# Patient Record
Sex: FEMALE | Race: WHITE | NOT HISPANIC OR LATINO | URBAN - METROPOLITAN AREA
[De-identification: names, ages, dates, MRNs, and addresses within clinical notes are randomized per-mention and may not be internally consistent; named-entity substitution may affect disease eponyms.]

---

## 2023-03-07 ENCOUNTER — TELEPHONE (OUTPATIENT)
Dept: PRIMARY CARE | Facility: CLINIC | Age: 78
End: 2023-03-07

## 2023-03-07 RX ORDER — LEVOTHYROXINE SODIUM 88 UG/1
TABLET ORAL
COMMUNITY
Start: 2012-12-15 | End: 2024-02-22 | Stop reason: SDUPTHER

## 2023-03-07 RX ORDER — CARVEDILOL 6.25 MG/1
1 TABLET ORAL 2 TIMES DAILY
COMMUNITY
Start: 2013-12-10 | End: 2023-06-27 | Stop reason: SDUPTHER

## 2023-03-07 RX ORDER — ACETAMINOPHEN AND CODEINE PHOSPHATE 300; 30 MG/1; MG/1
TABLET ORAL
COMMUNITY
Start: 2022-10-01 | End: 2023-12-12 | Stop reason: SDUPTHER

## 2023-03-07 RX ORDER — NALOXONE HYDROCHLORIDE 4 MG/.1ML
SPRAY NASAL
COMMUNITY
Start: 2022-06-14

## 2023-03-07 RX ORDER — SPIRONOLACTONE 25 MG/1
1 TABLET ORAL 2 TIMES DAILY
COMMUNITY
Start: 2012-08-16 | End: 2024-02-22 | Stop reason: SDUPTHER

## 2023-03-07 RX ORDER — LISINOPRIL 2.5 MG/1
1 TABLET ORAL DAILY
COMMUNITY
Start: 2012-11-08

## 2023-03-07 RX ORDER — OMEPRAZOLE 40 MG/1
1 CAPSULE, DELAYED RELEASE ORAL DAILY
COMMUNITY
Start: 2012-03-12 | End: 2024-02-22 | Stop reason: SDUPTHER

## 2023-03-07 RX ORDER — NITROGLYCERIN 0.4 MG/1
TABLET SUBLINGUAL
COMMUNITY
Start: 2013-08-29

## 2023-03-07 RX ORDER — FUROSEMIDE 20 MG/1
50 TABLET ORAL
COMMUNITY
Start: 2017-06-19

## 2023-03-10 NOTE — TELEPHONE ENCOUNTER
PT IS REQUESTING A REFILL OF HER ATORVASTATIN, LEVOTHYROXINE & OMEPRAZOLE TO THE [facility].

## 2023-03-23 LAB
6-ACETYLMORPHINE: <25 NG/ML
7-AMINOCLONAZEPAM: <25 NG/ML
ALPHA-HYDROXYALPRAZOLAM: <25 NG/ML
ALPHA-HYDROXYMIDAZOLAM: <25 NG/ML
ALPRAZOLAM: <25 NG/ML
AMPHETAMINE (PRESENCE) IN URINE BY SCREEN METHOD: ABNORMAL
BARBITURATES PRESENCE IN URINE BY SCREEN METHOD: ABNORMAL
CANNABINOIDS IN URINE BY SCREEN METHOD: ABNORMAL
CHLORDIAZEPOXIDE: <25 NG/ML
CLONAZEPAM: <25 NG/ML
COCAINE (PRESENCE) IN URINE BY SCREEN METHOD: ABNORMAL
CODEINE: >2500 NG/ML
CREATINE, URINE FOR DRUG: 85.9 MG/DL
DIAZEPAM: <25 NG/ML
DRUG SCREEN COMMENT URINE: ABNORMAL
EDDP: <25 NG/ML
FENTANYL CONFIRMATION, URINE: <2.5 NG/ML
HYDROCODONE: 82 NG/ML
HYDROMORPHONE: 76 NG/ML
LORAZEPAM: <25 NG/ML
METHADONE CONFIRMATION,URINE: <25 NG/ML
MIDAZOLAM: <25 NG/ML
MORPHINE URINE: >2500 NG/ML
NORDIAZEPAM: <25 NG/ML
NORFENTANYL: <2.5 NG/ML
NORHYDROCODONE: 137 NG/ML
NOROXYCODONE: <25 NG/ML
O-DESMETHYLTRAMADOL: <50 NG/ML
OXAZEPAM: <25 NG/ML
OXYCODONE: <25 NG/ML
OXYMORPHONE: <25 NG/ML
PHENCYCLIDINE (PRESENCE) IN URINE BY SCREEN METHOD: ABNORMAL
TEMAZEPAM: <25 NG/ML
TRAMADOL: <50 NG/ML
ZOLPIDEM METABOLITE (ZCA): <25 NG/ML
ZOLPIDEM: <25 NG/ML

## 2023-05-31 LAB
ALANINE AMINOTRANSFERASE (SGPT) (U/L) IN SER/PLAS: 16 U/L (ref 7–45)
ALBUMIN (G/DL) IN SER/PLAS: 4.3 G/DL (ref 3.4–5)
ALKALINE PHOSPHATASE (U/L) IN SER/PLAS: 123 U/L (ref 33–136)
ANION GAP IN SER/PLAS: 15 MMOL/L (ref 10–20)
ASPARTATE AMINOTRANSFERASE (SGOT) (U/L) IN SER/PLAS: 22 U/L (ref 9–39)
BASOPHILS (10*3/UL) IN BLOOD BY AUTOMATED COUNT: 0.06 X10E9/L (ref 0–0.1)
BASOPHILS/100 LEUKOCYTES IN BLOOD BY AUTOMATED COUNT: 0.7 % (ref 0–2)
BILIRUBIN TOTAL (MG/DL) IN SER/PLAS: 0.5 MG/DL (ref 0–1.2)
CALCIUM (MG/DL) IN SER/PLAS: 9.4 MG/DL (ref 8.6–10.6)
CARBON DIOXIDE, TOTAL (MMOL/L) IN SER/PLAS: 30 MMOL/L (ref 21–32)
CHLORIDE (MMOL/L) IN SER/PLAS: 98 MMOL/L (ref 98–107)
CHOLESTEROL (MG/DL) IN SER/PLAS: 221 MG/DL (ref 0–199)
CHOLESTEROL IN HDL (MG/DL) IN SER/PLAS: 75.6 MG/DL
CHOLESTEROL/HDL RATIO: 2.9
CREATININE (MG/DL) IN SER/PLAS: 1.4 MG/DL (ref 0.5–1.05)
EOSINOPHILS (10*3/UL) IN BLOOD BY AUTOMATED COUNT: 0.27 X10E9/L (ref 0–0.4)
EOSINOPHILS/100 LEUKOCYTES IN BLOOD BY AUTOMATED COUNT: 3.3 % (ref 0–6)
ERYTHROCYTE DISTRIBUTION WIDTH (RATIO) BY AUTOMATED COUNT: 13.1 % (ref 11.5–14.5)
ERYTHROCYTE MEAN CORPUSCULAR HEMOGLOBIN CONCENTRATION (G/DL) BY AUTOMATED: 31.1 G/DL (ref 32–36)
ERYTHROCYTE MEAN CORPUSCULAR VOLUME (FL) BY AUTOMATED COUNT: 97 FL (ref 80–100)
ERYTHROCYTES (10*6/UL) IN BLOOD BY AUTOMATED COUNT: 4.01 X10E12/L (ref 4–5.2)
GFR FEMALE: 39 ML/MIN/1.73M2
GLUCOSE (MG/DL) IN SER/PLAS: 82 MG/DL (ref 74–99)
HEMATOCRIT (%) IN BLOOD BY AUTOMATED COUNT: 38.9 % (ref 36–46)
HEMOGLOBIN (G/DL) IN BLOOD: 12.1 G/DL (ref 12–16)
IMMATURE GRANULOCYTES/100 LEUKOCYTES IN BLOOD BY AUTOMATED COUNT: 0.4 % (ref 0–0.9)
LACTATE DEHYDROGENASE (U/L) IN SER/PLAS BY LAC->PYR RXN: 221 U/L (ref 84–246)
LDL: 120 MG/DL (ref 0–99)
LEUKOCYTES (10*3/UL) IN BLOOD BY AUTOMATED COUNT: 8.1 X10E9/L (ref 4.4–11.3)
LYMPHOCYTES (10*3/UL) IN BLOOD BY AUTOMATED COUNT: 2.28 X10E9/L (ref 0.8–3)
LYMPHOCYTES/100 LEUKOCYTES IN BLOOD BY AUTOMATED COUNT: 28 % (ref 13–44)
MAGNESIUM (MG/DL) IN SER/PLAS: 1.85 MG/DL (ref 1.6–2.4)
MONOCYTES (10*3/UL) IN BLOOD BY AUTOMATED COUNT: 0.67 X10E9/L (ref 0.05–0.8)
MONOCYTES/100 LEUKOCYTES IN BLOOD BY AUTOMATED COUNT: 8.2 % (ref 2–10)
NEUTROPHILS (10*3/UL) IN BLOOD BY AUTOMATED COUNT: 4.83 X10E9/L (ref 1.6–5.5)
NEUTROPHILS/100 LEUKOCYTES IN BLOOD BY AUTOMATED COUNT: 59.4 % (ref 40–80)
NRBC (PER 100 WBCS) BY AUTOMATED COUNT: 0 /100 WBC (ref 0–0)
PHOSPHATE (MG/DL) IN SER/PLAS: 4.2 MG/DL (ref 2.5–4.9)
PLATELETS (10*3/UL) IN BLOOD AUTOMATED COUNT: 321 X10E9/L (ref 150–450)
POTASSIUM (MMOL/L) IN SER/PLAS: 4.6 MMOL/L (ref 3.5–5.3)
PROTEIN TOTAL: 6.8 G/DL (ref 6.4–8.2)
SODIUM (MMOL/L) IN SER/PLAS: 138 MMOL/L (ref 136–145)
THYROTROPIN (MIU/L) IN SER/PLAS BY DETECTION LIMIT <= 0.05 MIU/L: 0.5 MIU/L (ref 0.44–3.98)
TRIGLYCERIDE (MG/DL) IN SER/PLAS: 128 MG/DL (ref 0–149)
URATE (MG/DL) IN SER/PLAS: 7.5 MG/DL (ref 2.3–6.7)
UREA NITROGEN (MG/DL) IN SER/PLAS: 20 MG/DL (ref 6–23)
VLDL: 26 MG/DL (ref 0–40)

## 2023-06-01 LAB — URINE CULTURE: NORMAL

## 2023-06-08 ENCOUNTER — APPOINTMENT (OUTPATIENT)
Dept: PRIMARY CARE | Facility: CLINIC | Age: 78
End: 2023-06-08

## 2023-06-27 ENCOUNTER — OFFICE VISIT (OUTPATIENT)
Dept: PRIMARY CARE | Facility: CLINIC | Age: 78
End: 2023-06-27
Payer: MEDICARE

## 2023-06-27 VITALS
HEART RATE: 73 BPM | BODY MASS INDEX: 28.51 KG/M2 | OXYGEN SATURATION: 95 % | SYSTOLIC BLOOD PRESSURE: 110 MMHG | WEIGHT: 146 LBS | DIASTOLIC BLOOD PRESSURE: 64 MMHG

## 2023-06-27 DIAGNOSIS — Z78.0 POST-MENOPAUSE: ICD-10-CM

## 2023-06-27 DIAGNOSIS — Z00.00 MEDICARE ANNUAL WELLNESS VISIT, SUBSEQUENT: Primary | ICD-10-CM

## 2023-06-27 DIAGNOSIS — G47.33 OBSTRUCTIVE SLEEP APNEA: ICD-10-CM

## 2023-06-27 DIAGNOSIS — M48.061 SPINAL STENOSIS OF LUMBAR REGION WITHOUT NEUROGENIC CLAUDICATION: ICD-10-CM

## 2023-06-27 DIAGNOSIS — K21.9 GASTROESOPHAGEAL REFLUX DISEASE WITHOUT ESOPHAGITIS: ICD-10-CM

## 2023-06-27 DIAGNOSIS — Z00.00 ROUTINE GENERAL MEDICAL EXAMINATION AT HEALTH CARE FACILITY: ICD-10-CM

## 2023-06-27 DIAGNOSIS — N18.32 CHRONIC KIDNEY DISEASE (CKD) STAGE G3B/A1, MODERATELY DECREASED GLOMERULAR FILTRATION RATE (GFR) BETWEEN 30-44 ML/MIN/1.73 SQUARE METER AND ALBUMINURIA CREATININE RATIO LESS THAN 30 MG/G (CMS* (MULTI): ICD-10-CM

## 2023-06-27 DIAGNOSIS — I50.9 CONGESTIVE HEART FAILURE, UNSPECIFIED HF CHRONICITY, UNSPECIFIED HEART FAILURE TYPE (MULTI): ICD-10-CM

## 2023-06-27 DIAGNOSIS — J44.9 CHRONIC OBSTRUCTIVE PULMONARY DISEASE, UNSPECIFIED COPD TYPE (MULTI): ICD-10-CM

## 2023-06-27 DIAGNOSIS — E03.9 ACQUIRED HYPOTHYROIDISM: ICD-10-CM

## 2023-06-27 DIAGNOSIS — E78.00 HYPERCHOLESTEROLEMIA: ICD-10-CM

## 2023-06-27 PROBLEM — E55.9 VITAMIN D DEFICIENCY: Status: ACTIVE | Noted: 2023-06-27

## 2023-06-27 PROBLEM — M17.0 OSTEOARTHRITIS OF KNEES, BILATERAL: Status: ACTIVE | Noted: 2023-06-27

## 2023-06-27 PROCEDURE — 99214 OFFICE O/P EST MOD 30 MIN: CPT | Performed by: FAMILY MEDICINE

## 2023-06-27 PROCEDURE — G0439 PPPS, SUBSEQ VISIT: HCPCS | Performed by: FAMILY MEDICINE

## 2023-06-27 PROCEDURE — 1160F RVW MEDS BY RX/DR IN RCRD: CPT | Performed by: FAMILY MEDICINE

## 2023-06-27 PROCEDURE — 1159F MED LIST DOCD IN RCRD: CPT | Performed by: FAMILY MEDICINE

## 2023-06-27 PROCEDURE — 99397 PER PM REEVAL EST PAT 65+ YR: CPT | Performed by: FAMILY MEDICINE

## 2023-06-27 PROCEDURE — 1036F TOBACCO NON-USER: CPT | Performed by: FAMILY MEDICINE

## 2023-06-27 PROCEDURE — 1170F FXNL STATUS ASSESSED: CPT | Performed by: FAMILY MEDICINE

## 2023-06-27 RX ORDER — LANOLIN ALCOHOL/MO/W.PET/CERES
500 CREAM (GRAM) TOPICAL 2 TIMES DAILY
COMMUNITY

## 2023-06-27 RX ORDER — FLUOCINONIDE GEL 0.5 MG/G
GEL TOPICAL 2 TIMES DAILY
COMMUNITY

## 2023-06-27 RX ORDER — CETIRIZINE HYDROCHLORIDE 10 MG/1
10 TABLET ORAL DAILY
COMMUNITY

## 2023-06-27 RX ORDER — CARVEDILOL 6.25 MG/1
6.25 TABLET ORAL 2 TIMES DAILY
Qty: 180 TABLET | Refills: 1 | Status: SHIPPED | OUTPATIENT
Start: 2023-06-27

## 2023-06-27 RX ORDER — PROMETHAZINE HYDROCHLORIDE 25 MG/1
25 TABLET ORAL EVERY 6 HOURS PRN
COMMUNITY

## 2023-06-27 RX ORDER — ATORVASTATIN CALCIUM 20 MG/1
40 TABLET, FILM COATED ORAL DAILY
COMMUNITY
Start: 2022-09-29

## 2023-06-27 ASSESSMENT — PATIENT HEALTH QUESTIONNAIRE - PHQ9
SUM OF ALL RESPONSES TO PHQ9 QUESTIONS 1 AND 2: 0
2. FEELING DOWN, DEPRESSED OR HOPELESS: NOT AT ALL
SUM OF ALL RESPONSES TO PHQ9 QUESTIONS 1 AND 2: 0
1. LITTLE INTEREST OR PLEASURE IN DOING THINGS: NOT AT ALL
1. LITTLE INTEREST OR PLEASURE IN DOING THINGS: NOT AT ALL
2. FEELING DOWN, DEPRESSED OR HOPELESS: NOT AT ALL

## 2023-06-27 ASSESSMENT — ENCOUNTER SYMPTOMS
OCCASIONAL FEELINGS OF UNSTEADINESS: 0
LOSS OF SENSATION IN FEET: 0
DEPRESSION: 0

## 2023-06-27 ASSESSMENT — ACTIVITIES OF DAILY LIVING (ADL)
DRESSING: INDEPENDENT
MANAGING_FINANCES: INDEPENDENT
GROCERY_SHOPPING: INDEPENDENT
TAKING_MEDICATION: INDEPENDENT
DOING_HOUSEWORK: INDEPENDENT
BATHING: INDEPENDENT

## 2023-06-27 NOTE — PROGRESS NOTES
Medicare Wellness Billing Compliance Satisfied    *This is a visual tool to show completion of required items on the day of the visit. Green checks will only appear on the date of visit.    Review all medications by prescribing practitioner or clinical pharmacist (such as prescriptions, OTCs, herbal therapies and supplements) documented in the medical record    Past Medical, Surgical, and Family History reviewed and updated in chart    Tobacco Use Reviewed    Alcohol Use Reviewed    Illicit Drug Use Reviewed    PHQ2/9    Falls in Last Year Reviewed    Home Safety Risk Factors Reviewed    Cognitive Impairment Reviewed    Patient Self Assessment and Health Status    Current Diet Reviewed    Exercise Frequency    ADL - Hearing Impairment    ADL - Bathing    ADL - Dressing    ADL - Walks in Home    IADL - Managing Finances    IADL - Grocery Shopping    IADL - Taking Medications    IADL - Doing Housework    Subjective   Patient ID: Amira Alex is a 77 y.o. female who presents for Follow-up (MED FOLLOW UP) and Medicare Annual Wellness Visit Subsequent.    HPI   Pt is doing ok  Pt denies cp, sob   Does have some edema   Had labs  Some more strain renally  Sees cardio  Pt denies fatigue  Review of Systems   All other systems reviewed and are negative.      Objective   /64   Pulse 73   Wt 66.2 kg (146 lb)   SpO2 95%   BMI 28.51 kg/m²     Physical Exam  Constitutional:       Appearance: Normal appearance.   HENT:      Head: Normocephalic and atraumatic.      Right Ear: Tympanic membrane, ear canal and external ear normal.      Left Ear: Tympanic membrane, ear canal and external ear normal.      Nose: Nose normal.      Mouth/Throat:      Mouth: Mucous membranes are moist.      Pharynx: Oropharynx is clear.   Eyes:      Extraocular Movements: Extraocular movements intact.      Conjunctiva/sclera: Conjunctivae normal.      Pupils: Pupils are equal, round, and reactive to light.    Cardiovascular:      Rate and Rhythm: Normal rate and regular rhythm.      Pulses: Normal pulses.      Heart sounds: Normal heart sounds.   Pulmonary:      Effort: Pulmonary effort is normal.      Breath sounds: Normal breath sounds.   Abdominal:      General: Abdomen is flat. Bowel sounds are normal.      Palpations: Abdomen is soft.   Musculoskeletal:         General: Normal range of motion.      Cervical back: Normal range of motion and neck supple.   Skin:     General: Skin is warm and dry.      Capillary Refill: Capillary refill takes less than 2 seconds.   Neurological:      General: No focal deficit present.      Mental Status: She is alert and oriented to person, place, and time.   Psychiatric:         Mood and Affect: Mood normal.         Behavior: Behavior normal.         Thought Content: Thought content normal.         Assessment/Plan   Diagnoses and all orders for this visit:  Medicare annual wellness visit, subsequent  Chronic kidney disease (CKD) stage G3b/A1, moderately decreased glomerular filtration rate (GFR) between 30-44 mL/min/1.73 square meter and albuminuria creatinine ratio less than 30 mg/g  Congestive heart failure, unspecified HF chronicity, unspecified heart failure type (CMS/HCC)  -     carvedilol (Coreg) 6.25 mg tablet; Take 1 tablet (6.25 mg) by mouth 2 times a day.  Chronic obstructive pulmonary disease, unspecified COPD type (CMS/HCC)  Obstructive sleep apnea  Hypercholesterolemia  Gastroesophageal reflux disease without esophagitis  Acquired hypothyroidism  Spinal stenosis of lumbar region without neurogenic claudication  Post-menopause  -     XR DEXA bone density; Future  Routine general medical examination at health care facility

## 2023-06-27 NOTE — PROGRESS NOTES
Subjective   Reason for Visit: Amira Alex is an 77 y.o. female here for a Medicare Wellness visit.     Past Medical, Surgical, and Family History reviewed and updated in chart.    Reviewed all medications by prescribing practitioner or clinical pharmacist (such as prescriptions, OTCs, herbal therapies and supplements) and documented in the medical record.    HPI    Patient Care Team:  Ana Paula Julien DO as PCP - General  Ana Paula Julien DO as PCP - [employer] PCP     Review of Systems    Objective   Vitals:  /64   Pulse 73   Wt 66.2 kg (146 lb)   SpO2 95%   BMI 28.51 kg/m²       Physical Exam    Assessment/Plan   Problem List Items Addressed This Visit       Chronic kidney disease (CKD) stage G3b/A1, moderately decreased glomerular filtration rate (GFR) between 30-44 mL/min/1.73 square meter and albuminuria creatinine ratio less than 30 mg/g    Congestive heart failure (CMS/[facility])    Relevant Medications    carvedilol (Coreg) 6.25 mg tablet    COPD (chronic obstructive pulmonary disease) (CMS/HCC)    Esophageal reflux    Hypercholesterolemia    Hypothyroidism    Lumbar spinal stenosis    Obstructive sleep apnea    Medicare annual wellness visit, subsequent - Primary     Other Visit Diagnoses       Post-menopause        Relevant Orders    XR DEXA bone density    Routine general medical examination at health care facility

## 2023-08-11 LAB
ALBUMIN (G/DL) IN SER/PLAS: 4.3 G/DL (ref 3.4–5)
ANION GAP IN SER/PLAS: 13 MMOL/L (ref 10–20)
CALCIUM (MG/DL) IN SER/PLAS: 9.8 MG/DL (ref 8.6–10.6)
CARBON DIOXIDE, TOTAL (MMOL/L) IN SER/PLAS: 29 MMOL/L (ref 21–32)
CHLORIDE (MMOL/L) IN SER/PLAS: 101 MMOL/L (ref 98–107)
CHOLESTEROL (MG/DL) IN SER/PLAS: 174 MG/DL (ref 0–199)
CHOLESTEROL IN HDL (MG/DL) IN SER/PLAS: 67.1 MG/DL
CHOLESTEROL/HDL RATIO: 2.6
CREATININE (MG/DL) IN SER/PLAS: 1.38 MG/DL (ref 0.5–1.05)
GFR FEMALE: 39 ML/MIN/1.73M2
GLUCOSE (MG/DL) IN SER/PLAS: 90 MG/DL (ref 74–99)
LACTATE DEHYDROGENASE (U/L) IN SER/PLAS BY LAC->PYR RXN: 175 U/L (ref 84–246)
LDL: 83 MG/DL (ref 0–99)
PHOSPHATE (MG/DL) IN SER/PLAS: 4.2 MG/DL (ref 2.5–4.9)
POTASSIUM (MMOL/L) IN SER/PLAS: 4.6 MMOL/L (ref 3.5–5.3)
SODIUM (MMOL/L) IN SER/PLAS: 138 MMOL/L (ref 136–145)
TRIGLYCERIDE (MG/DL) IN SER/PLAS: 119 MG/DL (ref 0–149)
UREA NITROGEN (MG/DL) IN SER/PLAS: 15 MG/DL (ref 6–23)
VLDL: 24 MG/DL (ref 0–40)

## 2023-11-14 ENCOUNTER — APPOINTMENT (OUTPATIENT)
Dept: PAIN MEDICINE | Facility: CLINIC | Age: 78
End: 2023-11-14

## 2023-12-12 ENCOUNTER — OFFICE VISIT (OUTPATIENT)
Dept: PAIN MEDICINE | Facility: CLINIC | Age: 78
End: 2023-12-12
Payer: MEDICARE

## 2023-12-12 ENCOUNTER — LAB (OUTPATIENT)
Dept: LAB | Facility: LAB | Age: 78
End: 2023-12-12
Payer: MEDICARE

## 2023-12-12 DIAGNOSIS — M48.062 SPINAL STENOSIS OF LUMBAR REGION WITH NEUROGENIC CLAUDICATION: Primary | ICD-10-CM

## 2023-12-12 DIAGNOSIS — Z79.891 LONG TERM CURRENT USE OF OPIATE ANALGESIC: ICD-10-CM

## 2023-12-12 DIAGNOSIS — M17.0 PRIMARY OSTEOARTHRITIS OF BOTH KNEES: ICD-10-CM

## 2023-12-12 LAB
AMPHETAMINES UR QL SCN: NORMAL
BARBITURATES UR QL SCN: NORMAL
BZE UR QL SCN: NORMAL
CANNABINOIDS UR QL SCN: NORMAL
CREAT UR-MCNC: 151.4 MG/DL (ref 20–320)
PCP UR QL SCN: NORMAL

## 2023-12-12 PROCEDURE — 80365 DRUG SCREENING OXYCODONE: CPT

## 2023-12-12 PROCEDURE — 1036F TOBACCO NON-USER: CPT | Performed by: PHYSICAL MEDICINE & REHABILITATION

## 2023-12-12 PROCEDURE — 80368 SEDATIVE HYPNOTICS: CPT

## 2023-12-12 PROCEDURE — 1160F RVW MEDS BY RX/DR IN RCRD: CPT | Performed by: PHYSICAL MEDICINE & REHABILITATION

## 2023-12-12 PROCEDURE — 80361 OPIATES 1 OR MORE: CPT

## 2023-12-12 PROCEDURE — 80346 BENZODIAZEPINES1-12: CPT

## 2023-12-12 PROCEDURE — 80307 DRUG TEST PRSMV CHEM ANLYZR: CPT

## 2023-12-12 PROCEDURE — 82570 ASSAY OF URINE CREATININE: CPT

## 2023-12-12 PROCEDURE — 80354 DRUG SCREENING FENTANYL: CPT

## 2023-12-12 PROCEDURE — 80358 DRUG SCREENING METHADONE: CPT

## 2023-12-12 PROCEDURE — 1159F MED LIST DOCD IN RCRD: CPT | Performed by: PHYSICAL MEDICINE & REHABILITATION

## 2023-12-12 PROCEDURE — 80373 DRUG SCREENING TRAMADOL: CPT

## 2023-12-12 PROCEDURE — 99214 OFFICE O/P EST MOD 30 MIN: CPT | Performed by: PHYSICAL MEDICINE & REHABILITATION

## 2023-12-12 RX ORDER — ACETAMINOPHEN AND CODEINE PHOSPHATE 300; 30 MG/1; MG/1
1 TABLET ORAL EVERY 8 HOURS PRN
Qty: 90 TABLET | Refills: 1 | Status: SHIPPED | OUTPATIENT
Start: 2023-12-26 | End: 2024-02-26 | Stop reason: SDUPTHER

## 2023-12-12 NOTE — PROGRESS NOTES
Chief complaint  Back and knees pain     History  Ms Elena back for visit  The worst pain in the knee . She is also having back pain   The pain in the right knee is deep achy stabbing.  It is both on the medial and lateral aspects and behind the kneecap.  The knee pain is associated with sensation of crunching upon range of motion and weightbearing.  The pain is continuous at rest associated with stiffness with prolonged sitting and get worse with standing walking or any weightbearing activity.  Occasionally there is knee swelling.  No change in color or texture of the skin.  No pain proximal to the thigh or distal to the leg associated with the knee pain.  With episodes of aggravation of the pain there are occasion of sensation of instability but no falls.    Also The pain in the back is deep achy worse in the mid back area.  This is associated with tight muscle bands.  This limits the range of motion of the lumbar spine mainly in forward flexion.  The pain in the back is radiating around the side on the lateral aspect of the   thighs going down toward the lateral aspect of the legs into the lateral malleosli toward the lateral aspect of the feet towards the big toes.    This pain down to the lower limbs is more of a burning tingling sensation.  The pain in the   lower limbs worsens with bending forward or with any lifting, it improves with laying on the side and resting.  This is similar to the associated pain in the middle to lower back.  Denied any bowel or bladder incontinence.  With the worst pain there is tendency to catch the toe especially on carpeted area.  This occurs mostly when tired and toward the end of the day.       Pain level without medication is 8/10 , with the medication pain level 5/10.     The pain meds are helping control the pain and improving Activities of Daily living and quality of life and quality of sleep.    opioids treatment agreement 2023  Oarrs pulled and scanned in the chart   no concerns  last urine toxicology testing earlier this year and it was compliant we will repeat  Xray updated spine and knees  ORT Score is  0  Pain pathology and pain generators spine knees   Modalities tried injection, surgery, physical therapy, TENS unit, nonsteroidal anti-inflammatory medication       Denied any fever or chills. No weight loss and no night sweats. No cough or sputum production. No diarrhea   The constipation has been responding to fibers and over the counter medications.     No bladder and bowel incontinence and no other changes in bladder and bowel. No skin changes.  Reports tiredness and fatigability only if the pain is not controlled.   Denied opioids diversion and abuse and denies alcoholism. Denies overuse of the pain medications.    The control of the pain with the pain medications is helping the control of the symptoms and allowing the function and activities of daily living, enjoyment of life, improving the quality of life and sleep with less interruption by the pain. The goal is symptomatic control of the nonmalignant chronic pain and not to repair the permanent damage in the tissues inducing the chronic pain conditions. We are aiming to shift the focus from the nonmalignant chronic pain to other aspects of life by symptomatically treating this chronic pain. If this pain is not treated it will lead to major morbidity and it is also associated with increased risks of mortality. The patient understands those very clearly and also understand high risks of morbidity and mortality if not strictly adherent to the treatment recommendations and reporting any associated side effects. Also patient understand the full responsibility associated with these medications to avoid abuse or overuse or any use of these medications for anything besides treating the patient's own chronic pain and nothing else under any circumstances.        Physical examination  Awake, alert and oriented for time place and  persons   declined Chaperone for the visit and was adequately  draped for the exam.    Examination of the right knee showed mild clinical effusion. Normal skin color and texture palpation of the knee showed tenderness over the medial and lateral joint line and the sensation of crepitation upon range of motion.  Range of motion from -2 degrees to 105 degrees. No medial lateral instability. No drawer sign.  Lachman is negative.  Positive Roxy both medially and laterally.  Negative pivot shift.  Homans' sign is negative.  Calves are soft.  Knee flexion and extension is 4/5 and limited by the pain.    Similar findings on the left     Diagnosis  Problem List Items Addressed This Visit       Lumbar spinal stenosis - Primary    Relevant Medications    acetaminophen-codeine (Tylenol w/ Codeine #3) 300-30 mg tablet (Start on 12/26/2023)    Osteoarthritis of knees, bilateral    Relevant Medications    acetaminophen-codeine (Tylenol w/ Codeine #3) 300-30 mg tablet (Start on 12/26/2023)     Other Visit Diagnoses       Long term current use of opiate analgesic        Relevant Medications    acetaminophen-codeine (Tylenol w/ Codeine #3) 300-30 mg tablet (Start on 12/26/2023)    Other Relevant Orders    Opiate/Opioid/Benzo Prescription Compliance             Plan  Reviewed the pain generators.  Went over the types of pain with neuropathic and nociceptive and different pathologies and therapeutic modalities. Discussed the mechanism of action of interventions from acupuncture, physical therapy , regular exercises, injections, botox, spinal cord stimulation, and role of surgery     Went over pathology of the intervertebral disc displacement and the anatomical relation to the Nerve roots and relation to the radicular symptoms. Went over treatment modalities with conservative treatment including acupuncture   and epidural steroid injection with fluoroscopy guidance and last resort of surgery    Based on the above findings and the  clinical response to the opioids medications and improvement of the activities of daily living, sleep, and work performance. We made this complex decision to continue the opioids therapy in light of the evidence of the patient's responsibility in using the pain medications as prescribed for the nonmalignant chronic pain condition. We discussed about the use of the pain medications to treat the symptoms of chronic nonmalignant pain and we are not trying the repair the permanent damage in the tissues, rather we are trying to control the symptoms induced by the permanent damage to the tissues inducing the chronic pain condition and resulting disability. I explained the difference and discussed it with the patient and stressed the importance of knowing the difference especially because of the potential side effects and the potential addicting effect and habit forming nature of the dangerous drugs we are using to treat the symptoms of the chronic pain.      We discussed that we are prescribing the medications on good luigi and legitimate medical reason.     We reviewed the side effects and precautions of opioids prescriptions as discussed in the opioids treatment agreement.    realizes the interaction between the therapeutic classes including the respiratory depression and potential death     Random drug testing twice in 6 months we will submit     Tylenol codeine #3 TID     Discussed about NSAIDS and I explained about the opioids sparing effect to allow keeping the opioids dose at minimal effective dose.   I went over the potential side effects of the NSAIDS on the gastrointestinal, renal and cardiovascular systems.      I detailed the side effects from the acetaminophen in the medication and made aware of those. I also explained about the cumulative effects on the organs and mainly the liver.     Given the opioids therapy , we discussed about the risk for accidental over dose on the pain medications, either for patient  or other household. I went over the mechanism of action and mode of use of the Naloxone according to the  recommendations. I will provide a prescription for a kit.     Follow-up 8 weeks or earlier if needed     The level of clinical decision making in this office visit,  is high, given the high risks of complications with the morbidity and mortality due to the fact that acute and chronic pain may pose a threat to life and bodily function, if under treated, poorly treated, or with failure to maintain adequate treatment and timely medical follow up. Additionally over treatment has its own set of complications including overdosing on the pain medications and also the habit forming potentials with the use of the medications used to treat chronic painful conditions including therapeutic classes classified as dangerous medications. Given the serious and fluctuating nature of pain level and instensity with extensive consideration for whenever pain changes, there is always the risk of prolonged functional impairment requiring close patient monitoring with regular assessments and reassessments and high level medical decision making at every office visit. The amount and complexity of data reviewed is high given the patient clinical presentation, labs,  data, radiology reports, and other tests as discussed during office visits. Pertinent data whether positive or negative were taken in consideration in the process of making this high level medical decision.

## 2023-12-15 LAB
1OH-MIDAZOLAM UR CFM-MCNC: <25 NG/ML
6MAM UR CFM-MCNC: <25 NG/ML
7AMINOCLONAZEPAM UR CFM-MCNC: <25 NG/ML
A-OH ALPRAZ UR CFM-MCNC: <25 NG/ML
ALPRAZ UR CFM-MCNC: <25 NG/ML
CHLORDIAZEP UR CFM-MCNC: <25 NG/ML
CLONAZEPAM UR CFM-MCNC: <25 NG/ML
CODEINE UR CFM-MCNC: <50 NG/ML
DIAZEPAM UR CFM-MCNC: <25 NG/ML
EDDP UR CFM-MCNC: <25 NG/ML
FENTANYL UR CFM-MCNC: <2.5 NG/ML
HYDROCODONE CTO UR CFM-MCNC: <25 NG/ML
HYDROMORPHONE UR CFM-MCNC: 140 NG/ML
LORAZEPAM UR CFM-MCNC: <25 NG/ML
METHADONE UR CFM-MCNC: <25 NG/ML
MIDAZOLAM UR CFM-MCNC: <25 NG/ML
MORPHINE UR CFM-MCNC: >2500 NG/ML
NORDIAZEPAM UR CFM-MCNC: <25 NG/ML
NORFENTANYL UR CFM-MCNC: <2.5 NG/ML
NORHYDROCODONE UR CFM-MCNC: 292 NG/ML
NOROXYCODONE UR CFM-MCNC: <25 NG/ML
NORTRAMADOL UR-MCNC: <50 NG/ML
OXAZEPAM UR CFM-MCNC: <25 NG/ML
OXYCODONE UR CFM-MCNC: <25 NG/ML
OXYMORPHONE UR CFM-MCNC: <25 NG/ML
TEMAZEPAM UR CFM-MCNC: <25 NG/ML
TRAMADOL UR CFM-MCNC: <50 NG/ML
ZOLPIDEM UR CFM-MCNC: <25 NG/ML
ZOLPIDEM UR-MCNC: <25 NG/ML

## 2023-12-20 ENCOUNTER — APPOINTMENT (OUTPATIENT)
Dept: PRIMARY CARE | Facility: CLINIC | Age: 78
End: 2023-12-20

## 2023-12-27 ENCOUNTER — LAB (OUTPATIENT)
Dept: LAB | Facility: LAB | Age: 78
End: 2023-12-27
Payer: MEDICARE

## 2023-12-27 DIAGNOSIS — E03.9 HYPOTHYROIDISM, UNSPECIFIED: ICD-10-CM

## 2023-12-27 DIAGNOSIS — I12.9 HYPERTENSIVE CHRONIC KIDNEY DISEASE WITH STAGE 1 THROUGH STAGE 4 CHRONIC KIDNEY DISEASE, OR UNSPECIFIED CHRONIC KIDNEY DISEASE: ICD-10-CM

## 2023-12-27 DIAGNOSIS — I50.30 UNSPECIFIED DIASTOLIC (CONGESTIVE) HEART FAILURE (MULTI): Primary | ICD-10-CM

## 2023-12-27 DIAGNOSIS — N39.0 URINARY TRACT INFECTION, SITE NOT SPECIFIED: ICD-10-CM

## 2023-12-27 LAB
ALBUMIN SERPL BCP-MCNC: 4.2 G/DL (ref 3.4–5)
ALP SERPL-CCNC: 113 U/L (ref 33–136)
ANION GAP SERPL CALC-SCNC: 14 MMOL/L (ref 10–20)
AST SERPL W P-5'-P-CCNC: 20 U/L (ref 9–39)
BUN SERPL-MCNC: 19 MG/DL (ref 6–23)
CALCIUM SERPL-MCNC: 9.4 MG/DL (ref 8.6–10.6)
CHLORIDE SERPL-SCNC: 99 MMOL/L (ref 98–107)
CHOLEST SERPL-MCNC: 182 MG/DL (ref 0–199)
CHOLESTEROL/HDL RATIO: 2.3
CO2 SERPL-SCNC: 33 MMOL/L (ref 21–32)
CREAT SERPL-MCNC: 1.46 MG/DL (ref 0.5–1.05)
ERYTHROCYTE [DISTWIDTH] IN BLOOD BY AUTOMATED COUNT: 12.8 % (ref 11.5–14.5)
GFR SERPL CREATININE-BSD FRML MDRD: 37 ML/MIN/1.73M*2
GLUCOSE SERPL-MCNC: 79 MG/DL (ref 74–99)
HCT VFR BLD AUTO: 37.8 % (ref 36–46)
HDLC SERPL-MCNC: 77.5 MG/DL
HGB BLD-MCNC: 12.2 G/DL (ref 12–16)
LDH SERPL L TO P-CCNC: 205 U/L (ref 84–246)
LDLC SERPL CALC-MCNC: 87 MG/DL
MAGNESIUM SERPL-MCNC: 1.4 MG/DL (ref 1.6–2.4)
MCH RBC QN AUTO: 31 PG (ref 26–34)
MCHC RBC AUTO-ENTMCNC: 32.3 G/DL (ref 32–36)
MCV RBC AUTO: 96 FL (ref 80–100)
NON HDL CHOLESTEROL: 105 MG/DL (ref 0–149)
NRBC BLD-RTO: 0 /100 WBCS (ref 0–0)
PHOSPHATE SERPL-MCNC: 3.9 MG/DL (ref 2.5–4.9)
PLATELET # BLD AUTO: 352 X10*3/UL (ref 150–450)
POTASSIUM SERPL-SCNC: 4.4 MMOL/L (ref 3.5–5.3)
RBC # BLD AUTO: 3.94 X10*6/UL (ref 4–5.2)
SODIUM SERPL-SCNC: 142 MMOL/L (ref 136–145)
T3 SERPL-MCNC: 141 NG/DL (ref 60–200)
T4 FREE SERPL-MCNC: 1.43 NG/DL (ref 0.78–1.48)
TRIGL SERPL-MCNC: 86 MG/DL (ref 0–149)
TSH SERPL-ACNC: 1.71 MIU/L (ref 0.44–3.98)
URATE SERPL-MCNC: 7.6 MG/DL (ref 2.3–6.7)
VLDL: 17 MG/DL (ref 0–40)
WBC # BLD AUTO: 9.9 X10*3/UL (ref 4.4–11.3)

## 2023-12-27 PROCEDURE — 84443 ASSAY THYROID STIM HORMONE: CPT

## 2023-12-27 PROCEDURE — 87086 URINE CULTURE/COLONY COUNT: CPT

## 2023-12-27 PROCEDURE — 84075 ASSAY ALKALINE PHOSPHATASE: CPT

## 2023-12-27 PROCEDURE — 36415 COLL VENOUS BLD VENIPUNCTURE: CPT

## 2023-12-27 PROCEDURE — 85027 COMPLETE CBC AUTOMATED: CPT

## 2023-12-27 PROCEDURE — 83615 LACTATE (LD) (LDH) ENZYME: CPT

## 2023-12-27 PROCEDURE — 80069 RENAL FUNCTION PANEL: CPT

## 2023-12-27 PROCEDURE — 84439 ASSAY OF FREE THYROXINE: CPT

## 2023-12-27 PROCEDURE — 84550 ASSAY OF BLOOD/URIC ACID: CPT

## 2023-12-27 PROCEDURE — 84480 ASSAY TRIIODOTHYRONINE (T3): CPT

## 2023-12-27 PROCEDURE — 83735 ASSAY OF MAGNESIUM: CPT

## 2023-12-27 PROCEDURE — 80061 LIPID PANEL: CPT

## 2023-12-27 PROCEDURE — 84450 TRANSFERASE (AST) (SGOT): CPT

## 2023-12-28 LAB — BACTERIA UR CULT: NO GROWTH

## 2024-02-07 ENCOUNTER — APPOINTMENT (OUTPATIENT)
Dept: PRIMARY CARE | Facility: CLINIC | Age: 79
End: 2024-02-07
Payer: COMMERCIAL

## 2024-02-20 ENCOUNTER — OFFICE VISIT (OUTPATIENT)
Dept: PAIN MEDICINE | Facility: CLINIC | Age: 79
End: 2024-02-20
Payer: COMMERCIAL

## 2024-02-20 ENCOUNTER — APPOINTMENT (OUTPATIENT)
Dept: LAB | Facility: LAB | Age: 79
End: 2024-02-20
Payer: COMMERCIAL

## 2024-02-20 DIAGNOSIS — M17.0 PRIMARY OSTEOARTHRITIS OF BOTH KNEES: ICD-10-CM

## 2024-02-20 DIAGNOSIS — M48.062 SPINAL STENOSIS OF LUMBAR REGION WITH NEUROGENIC CLAUDICATION: Primary | ICD-10-CM

## 2024-02-20 PROCEDURE — 99214 OFFICE O/P EST MOD 30 MIN: CPT | Performed by: PHYSICAL MEDICINE & REHABILITATION

## 2024-02-20 PROCEDURE — 1036F TOBACCO NON-USER: CPT | Performed by: PHYSICAL MEDICINE & REHABILITATION

## 2024-02-20 NOTE — PROGRESS NOTES
Chief complaint  Back and pain in knees    History  Ms Fallon is back for visit  Continues with pain in back  Did not want injection nor SCS   Pain in the back There is decreased sensory to light touch on the lateral aspects of the thighs and around the   knee going down to the lateral aspect of the legs to the   lateral malleoli into the dorsum of the feet..    Deep tendon reflexes is present for the patellar tendons bilaterally.  Achilles reflexes are present bilaterally and symmetric.    Medial Hamstrings reflex is decreased bilaterally  Plantar cutaneous are downgoing.  Ankle dorsiflexion is 5/5 bilaterally.  Plantar flexion of the ankles are 5/5 bilaterally.  Big toe extension is 4/5 bilaterally  Negative Tinel's sign over the right peroneal nerve at the fibular neck.  Atilio sign for axial loading and global rotation are negative.  No aberrant pain behavior.  Also knees pain      Pain level without medication is 8/10 , with the medication pain level 3/10.     The pain meds are helping control the pain and improving Activities of Daily living and quality of life and quality of sleep.    opioids treatment agreement Feb 2024  PDI (Pain Disability Index) score: 29  Oarrs pulled and scanned in the chart  no concerns  last urine toxicology testing earlier this year and it was compliant we will repeat  Xray updated spine   ORT Score is  0  Pain pathology and pain generators spine and knees   Modalities tried injection, surgery, physical therapy, TENS unit, nonsteroidal anti-inflammatory medication       Denied any fever or chills. No weight loss and no night sweats. No cough or sputum production. No diarrhea   The constipation has been responding to fibers and over the counter medications.     No bladder and bowel incontinence and no other changes in bladder and bowel. No skin changes.  Reports tiredness and fatigability only if the pain is not controlled.   Denied opioids diversion and abuse and denies  alcoholism. Denies overuse of the pain medications.    The control of the pain with the pain medications is helping the control of the symptoms and allowing the function and activities of daily living, enjoyment of life, improving the quality of life and sleep with less interruption by the pain. The goal is symptomatic control of the nonmalignant chronic pain and not to repair the permanent damage in the tissues inducing the chronic pain conditions. We are aiming to shift the focus from the nonmalignant chronic pain to other aspects of life by symptomatically treating this chronic pain. If this pain is not treated it will lead to major morbidity and it is also associated with increased risks of mortality. The patient understands those very clearly and also understand high risks of morbidity and mortality if not strictly adherent to the treatment recommendations and reporting any associated side effects. Also patient understand the full responsibility associated with these medications to avoid abuse or overuse or any use of these medications for anything besides treating the patient's own chronic pain and nothing else under any circumstances.        Physical examination  Awake, alert and oriented for time place and persons   declined Chaperone for the visit and was adequately  draped for the exam.    Examination of the lumbar spine showed mild reversal of the lumbar lordosis .    Tight muscle bands over the   lower lumbar paraspinals area.  Batista test on the   lower lumbar area increases the pain with stabilization of the lower lumbar facets bilaterally at  L45 and L5S1,  combined with extension and rotation of the lumbar spine to the eith side reproduced and worsened the back pain on that side.  The pain improved with leaning forward.  Straight leg raising was negative.  No sensorimotor deficits in the lower limbs.  Atilio testing were negative for axial loading and log rotation.  No aberrant pain behavior.       Diagnosis  Problem List Items Addressed This Visit       Lumbar spinal stenosis - Primary    Osteoarthritis of knees, bilateral        Plan  Reviewed the pain generators.  Went over the types of pain with neuropathic and nociceptive and different pathologies and therapeutic modalities. Discussed the mechanism of action of interventions from acupuncture, physical therapy , regular exercises, injections, botox, spinal cord stimulation, and role of surgery     Went over pathology of the intervertebral disc displacement and the anatomical relation to the Nerve roots and relation to the radicular symptoms. Went over treatment modalities with conservative treatment including acupuncture   and epidural steroid injection with fluoroscopy guidance and last resort of surgery    Based on the above findings and the clinical response to the opioids medications and improvement of the activities of daily living, sleep, and work performance. We made this complex decision to continue the opioids therapy in light of the evidence of the patient's responsibility in using the pain medications as prescribed for the nonmalignant chronic pain condition. We discussed about the use of the pain medications to treat the symptoms of chronic nonmalignant pain and we are not trying the repair the permanent damage in the tissues, rather we are trying to control the symptoms induced by the permanent damage to the tissues inducing the chronic pain condition and resulting disability. I explained the difference and discussed it with the patient and stressed the importance of knowing the difference especially because of the potential side effects and the potential addicting effect and habit forming nature of the dangerous drugs we are using to treat the symptoms of the chronic pain.      We discussed that we are prescribing the medications on good luigi and legitimate medical reason.     We reviewed the side effects and precautions of opioids  prescriptions as discussed in the opioids treatment agreement.    realizes the interaction between the therapeutic classes including the respiratory depression and potential death     Random drug testing twice in 6 months we will submit     T#3 , 3 times a day   has a narcan at home know how and when to use it if needed.   Continue with pain meds to control pain but consider cutting back on pain meds   In past gabapentin and lyrica did not help     Discussed about NSAIDS and I explained about the opioids sparing effect to allow keeping the opioids dose at minimal effective dose.   I went over the potential side effects of the NSAIDS on the gastrointestinal, renal and cardiovascular systems.      I detailed the side effects from the acetaminophen in the medication and made aware of those. I also explained about the cumulative effects on the organs and mainly the liver.     Given the opioids therapy , we discussed about the risk for accidental over dose on the pain medications, either for patient or other household. I went over the mechanism of action and mode of use of the Naloxone according to the  recommendations. I will provide a prescription for a kit.     Follow-up 8 weeks or earlier if needed     The level of clinical decision making in this office visit,  is high, given the high risks of complications with the morbidity and mortality due to the fact that acute and chronic pain may pose a threat to life and bodily function, if under treated, poorly treated, or with failure to maintain adequate treatment and timely medical follow up. Additionally over treatment has its own set of complications including overdosing on the pain medications and also the habit forming potentials with the use of the medications used to treat chronic painful conditions including therapeutic classes classified as dangerous medications. Given the serious and fluctuating nature of pain level and instensity with extensive  consideration for whenever pain changes, there is always the risk of prolonged functional impairment requiring close patient monitoring with regular assessments and reassessments and high level medical decision making at every office visit. The amount and complexity of data reviewed is high given the patient clinical presentation, labs,  data, radiology reports, and other tests as discussed during office visits. Pertinent data whether positive or negative were taken in consideration in the process of making this high level medical decision.

## 2024-02-22 ENCOUNTER — OFFICE VISIT (OUTPATIENT)
Dept: PRIMARY CARE | Facility: CLINIC | Age: 79
End: 2024-02-22
Payer: COMMERCIAL

## 2024-02-22 VITALS
TEMPERATURE: 97.2 F | BODY MASS INDEX: 27.11 KG/M2 | WEIGHT: 153 LBS | OXYGEN SATURATION: 96 % | HEIGHT: 63 IN | SYSTOLIC BLOOD PRESSURE: 127 MMHG | HEART RATE: 68 BPM | DIASTOLIC BLOOD PRESSURE: 67 MMHG

## 2024-02-22 DIAGNOSIS — J44.9 CHRONIC OBSTRUCTIVE PULMONARY DISEASE, UNSPECIFIED COPD TYPE (MULTI): ICD-10-CM

## 2024-02-22 DIAGNOSIS — E03.9 ACQUIRED HYPOTHYROIDISM: ICD-10-CM

## 2024-02-22 DIAGNOSIS — K21.9 GASTROESOPHAGEAL REFLUX DISEASE WITHOUT ESOPHAGITIS: ICD-10-CM

## 2024-02-22 DIAGNOSIS — I50.23 HEART FAILURE, SYSTOLIC, ACUTE ON CHRONIC (MULTI): ICD-10-CM

## 2024-02-22 DIAGNOSIS — N18.32 CHRONIC KIDNEY DISEASE (CKD) STAGE G3B/A1, MODERATELY DECREASED GLOMERULAR FILTRATION RATE (GFR) BETWEEN 30-44 ML/MIN/1.73 SQUARE METER AND ALBUMINURIA CREATININE RATIO LESS THAN 30 MG/G (CMS* (MULTI): Primary | ICD-10-CM

## 2024-02-22 DIAGNOSIS — I50.9 CONGESTIVE HEART FAILURE, UNSPECIFIED HF CHRONICITY, UNSPECIFIED HEART FAILURE TYPE (MULTI): ICD-10-CM

## 2024-02-22 DIAGNOSIS — M06.9 RHEUMATOID ARTHRITIS, INVOLVING UNSPECIFIED SITE, UNSPECIFIED WHETHER RHEUMATOID FACTOR PRESENT (MULTI): ICD-10-CM

## 2024-02-22 DIAGNOSIS — I73.9 PERIPHERAL VASCULAR DISEASE, UNSPECIFIED (CMS-HCC): ICD-10-CM

## 2024-02-22 DIAGNOSIS — E78.00 HYPERCHOLESTEROLEMIA: ICD-10-CM

## 2024-02-22 PROBLEM — N18.30 STAGE 3 CHRONIC KIDNEY DISEASE (MULTI): Status: ACTIVE | Noted: 2023-06-27

## 2024-02-22 PROBLEM — B96.89 ACUTE BACTERIAL SINUSITIS: Status: ACTIVE | Noted: 2024-02-22

## 2024-02-22 PROBLEM — M75.40 IMPINGEMENT SYNDROME OF SHOULDER REGION: Status: ACTIVE | Noted: 2024-02-22

## 2024-02-22 PROBLEM — L57.0 ACTINIC KERATOSIS: Status: ACTIVE | Noted: 2018-09-17

## 2024-02-22 PROBLEM — L82.1 OTHER SEBORRHEIC KERATOSIS: Status: ACTIVE | Noted: 2018-09-17

## 2024-02-22 PROBLEM — L03.90 CELLULITIS: Status: ACTIVE | Noted: 2024-02-22

## 2024-02-22 PROBLEM — G47.9 DISTURBANCE IN SLEEP BEHAVIOR: Status: ACTIVE | Noted: 2024-02-22

## 2024-02-22 PROBLEM — I87.2 VENOUS STASIS DERMATITIS: Status: ACTIVE | Noted: 2024-02-22

## 2024-02-22 PROBLEM — F41.9 ANXIETY: Status: ACTIVE | Noted: 2024-02-22

## 2024-02-22 PROBLEM — M17.9 OSTEOARTHRITIS OF KNEE: Status: ACTIVE | Noted: 2023-06-27

## 2024-02-22 PROBLEM — J20.9 ACUTE BRONCHITIS: Status: ACTIVE | Noted: 2024-02-22

## 2024-02-22 PROBLEM — M62.81 MUSCLE WEAKNESS: Status: ACTIVE | Noted: 2024-02-22

## 2024-02-22 PROBLEM — L57.9 SKIN CHANGES DUE TO CHRONIC EXPOSURE TO NONIONIZING RADIATION, UNSPECIFIED: Status: ACTIVE | Noted: 2018-09-17

## 2024-02-22 PROBLEM — M47.816 SPONDYLOSIS OF LUMBAR SPINE: Status: ACTIVE | Noted: 2024-02-22

## 2024-02-22 PROBLEM — D22.9 MELANOCYTIC NEVUS OF SKIN: Status: ACTIVE | Noted: 2024-02-22

## 2024-02-22 PROBLEM — E78.5 DYSLIPIDEMIA: Status: ACTIVE | Noted: 2024-02-22

## 2024-02-22 PROBLEM — R05.9 COUGH: Status: ACTIVE | Noted: 2024-02-22

## 2024-02-22 PROBLEM — J01.90 ACUTE BACTERIAL SINUSITIS: Status: ACTIVE | Noted: 2024-02-22

## 2024-02-22 PROBLEM — L72.0 EPIDERMAL CYST: Status: ACTIVE | Noted: 2018-09-17

## 2024-02-22 PROCEDURE — 99214 OFFICE O/P EST MOD 30 MIN: CPT | Performed by: FAMILY MEDICINE

## 2024-02-22 PROCEDURE — 1160F RVW MEDS BY RX/DR IN RCRD: CPT | Performed by: FAMILY MEDICINE

## 2024-02-22 PROCEDURE — 1036F TOBACCO NON-USER: CPT | Performed by: FAMILY MEDICINE

## 2024-02-22 PROCEDURE — 1159F MED LIST DOCD IN RCRD: CPT | Performed by: FAMILY MEDICINE

## 2024-02-22 RX ORDER — SPIRONOLACTONE 25 MG/1
25 TABLET ORAL 2 TIMES DAILY
Qty: 180 TABLET | Refills: 1 | Status: SHIPPED | OUTPATIENT
Start: 2024-02-22

## 2024-02-22 RX ORDER — LEVOTHYROXINE SODIUM 88 UG/1
88 TABLET ORAL
Qty: 90 TABLET | Refills: 1 | Status: SHIPPED | OUTPATIENT
Start: 2024-02-22

## 2024-02-22 RX ORDER — OMEPRAZOLE 40 MG/1
40 CAPSULE, DELAYED RELEASE ORAL DAILY
Qty: 90 CAPSULE | Refills: 1 | Status: SHIPPED | OUTPATIENT
Start: 2024-02-22

## 2024-02-22 ASSESSMENT — PATIENT HEALTH QUESTIONNAIRE - PHQ9
SUM OF ALL RESPONSES TO PHQ9 QUESTIONS 1 AND 2: 0
1. LITTLE INTEREST OR PLEASURE IN DOING THINGS: NOT AT ALL
2. FEELING DOWN, DEPRESSED OR HOPELESS: NOT AT ALL

## 2024-02-22 ASSESSMENT — ENCOUNTER SYMPTOMS
LOSS OF SENSATION IN FEET: 0
DEPRESSION: 0
OCCASIONAL FEELINGS OF UNSTEADINESS: 0

## 2024-02-22 NOTE — PROGRESS NOTES
"Subjective   Patient ID: Amira Alex is a 78 y.o. female who presents for fuv.    HPI     Review of Systems   All other systems reviewed and are negative.  Pt is here for follow up  Pt denies cp, sob,  She does have edema  A is stable  Mood is stable      Objective   /67   Pulse 68   Temp 36.2 °C (97.2 °F)   Ht 1.6 m (5' 3\")   Wt 69.4 kg (153 lb)   SpO2 96%   BMI 27.10 kg/m²     Physical Exam  Constitutional:       Appearance: Normal appearance.   HENT:      Head: Normocephalic and atraumatic.      Right Ear: Tympanic membrane, ear canal and external ear normal.      Left Ear: Tympanic membrane, ear canal and external ear normal.      Nose: Nose normal.      Mouth/Throat:      Mouth: Mucous membranes are moist.      Pharynx: Oropharynx is clear.   Eyes:      Extraocular Movements: Extraocular movements intact.      Conjunctiva/sclera: Conjunctivae normal.      Pupils: Pupils are equal, round, and reactive to light.   Cardiovascular:      Rate and Rhythm: Normal rate and regular rhythm.      Pulses: Normal pulses.      Heart sounds: Normal heart sounds.   Pulmonary:      Effort: Pulmonary effort is normal.      Breath sounds: Normal breath sounds.   Abdominal:      General: Abdomen is flat. Bowel sounds are normal.      Palpations: Abdomen is soft.   Genitourinary:     Comments: nl  Musculoskeletal:         General: Normal range of motion.      Cervical back: Normal range of motion and neck supple.   Skin:     General: Skin is warm and dry.      Capillary Refill: Capillary refill takes less than 2 seconds.   Neurological:      General: No focal deficit present.      Mental Status: She is alert and oriented to person, place, and time.   Psychiatric:         Mood and Affect: Mood normal.         Behavior: Behavior normal.         Thought Content: Thought content normal.         Assessment/Plan   Diagnoses and all orders for this visit:  Chronic kidney disease (CKD) stage G3b/A1, moderately decreased " glomerular filtration rate (GFR) between 30-44 mL/min/1.73 square meter and albuminuria creatinine ratio less than 30 mg/g (CMS/[facility])  -     CBC and Auto Differential; Future  Congestive heart failure, unspecified HF chronicity, unspecified heart failure type (CMS/[facility])  -     spironolactone (Aldactone) 25 mg tablet; Take 1 tablet (25 mg) by mouth 2 times a day.  -     CBC and Auto Differential; Future  Hypercholesterolemia  -     Lipid Panel; Future  -     Comprehensive Metabolic Panel; Future  Acquired hypothyroidism  -     levothyroxine (Synthroid, Levoxyl) 88 mcg tablet; Take 1 tablet (88 mcg) by mouth once daily in the morning. Take before meals.  -     TSH with reflex to Free T4 if abnormal; Future  Gastroesophageal reflux disease without esophagitis  -     omeprazole (PriLOSEC) 40 mg DR capsule; Take 1 capsule (40 mg) by mouth once daily.  Peripheral vascular disease, unspecified (CMS/[facility])  Rheumatoid arthritis, involving unspecified site, unspecified whether rheumatoid factor present (CMS/[facility])  Chronic obstructive pulmonary disease, unspecified COPD type (CMS/[facility])  Heart failure, systolic, acute on chronic (CMS/[facility])

## 2024-04-23 ENCOUNTER — OFFICE VISIT (OUTPATIENT)
Dept: PAIN MEDICINE | Facility: CLINIC | Age: 79
End: 2024-04-23
Payer: COMMERCIAL

## 2024-04-23 DIAGNOSIS — M48.062 SPINAL STENOSIS OF LUMBAR REGION WITH NEUROGENIC CLAUDICATION: ICD-10-CM

## 2024-04-23 DIAGNOSIS — M17.0 PRIMARY OSTEOARTHRITIS OF BOTH KNEES: ICD-10-CM

## 2024-04-23 DIAGNOSIS — M75.42 IMPINGEMENT SYNDROME OF LEFT SHOULDER: ICD-10-CM

## 2024-04-23 DIAGNOSIS — M17.12 PRIMARY OSTEOARTHRITIS OF LEFT KNEE: ICD-10-CM

## 2024-04-23 DIAGNOSIS — M62.81 MUSCLE WEAKNESS: ICD-10-CM

## 2024-04-23 DIAGNOSIS — Z79.891 LONG TERM CURRENT USE OF OPIATE ANALGESIC: Primary | ICD-10-CM

## 2024-04-23 PROCEDURE — 99214 OFFICE O/P EST MOD 30 MIN: CPT | Performed by: PHYSICAL MEDICINE & REHABILITATION

## 2024-04-23 PROCEDURE — 1159F MED LIST DOCD IN RCRD: CPT | Performed by: PHYSICAL MEDICINE & REHABILITATION

## 2024-04-23 PROCEDURE — 1160F RVW MEDS BY RX/DR IN RCRD: CPT | Performed by: PHYSICAL MEDICINE & REHABILITATION

## 2024-04-23 RX ORDER — ACETAMINOPHEN AND CODEINE PHOSPHATE 300; 30 MG/1; MG/1
1 TABLET ORAL EVERY 8 HOURS PRN
Qty: 90 TABLET | Refills: 1 | Status: SHIPPED | OUTPATIENT
Start: 2024-04-23

## 2024-04-23 NOTE — PROGRESS NOTES
Chief complaint  Pain in left knee and sholder    History  Amira Alex is back for pain management office visit  Continue with pain left shoulder knee  The pain in the left  knee is deep achy stabbing.  It is both on the medial and lateral aspects and behind the kneecap.  The knee pain is associated with sensation of crunching upon range of motion and weightbearing.  The pain is continuous at rest associated with stiffness with prolonged sitting and get worse with standing walking or any weightbearing activity.  Occasionally there is knee swelling.  No change in color or texture of the skin.  No pain proximal to the thigh or distal to the leg associated with the knee pain.  With episodes of aggravation of the pain there are occasion of sensation of instability but no falls.       Pain level without medication is 8/10 , with the medication pain level 3/10.     The pain meds are helping control the pain and improving Activities of Daily living and quality of life and quality of sleep.    opioids treatment agreement Jan 2024     Oarrs pulled and scanned in the chart  no concerns  last urine toxicology testing earlier this year and it was compliant we will repeat  Xray updated spine   ORT Score is  0  Pain pathology and pain generators joints   Modalities tried injection, surgery, physical therapy, TENS unit, nonsteroidal anti-inflammatory medication       Denied any fever or chills. No weight loss and no night sweats. No cough or sputum production. No diarrhea   The constipation has been responding to fibers and over the counter medications.     No bladder and bowel incontinence and no other changes in bladder and bowel. No skin changes.  Reports tiredness and fatigability only if the pain is not controlled.   Denied opioids diversion and abuse and denies alcoholism. Denies overuse of the pain medications.    The control of the pain with the pain medications is helping the control of the symptoms and allowing the  function and activities of daily living, enjoyment of life, improving the quality of life and sleep with less interruption by the pain. The goal is symptomatic control of the nonmalignant chronic pain and not to repair the permanent damage in the tissues inducing the chronic pain conditions. We are aiming to shift the focus from the nonmalignant chronic pain to other aspects of life by symptomatically treating this chronic pain. If this pain is not treated it will lead to major morbidity and it is also associated with increased risks of mortality. The patient understands those very clearly and also understand high risks of morbidity and mortality if not strictly adherent to the treatment recommendations and reporting any associated side effects. Also patient understand the full responsibility associated with these medications to avoid abuse or overuse or any use of these medications for anything besides treating the patient's own chronic pain and nothing else under any circumstances.        Physical examination  Awake, alert and oriented for time place and persons   declined Chaperone for the visit and was adequately  draped for the exam.  Examination of the left knee showed mild clinical effusion. Normal skin color and texture palpation of the knee showed tenderness over the medial and lateral joint line and the sensation of crepitation upon range of motion.  Range of motion from -2 degrees to 105 degrees. No medial lateral instability. No drawer sign.  Lachman is negative.  Positive Roxy both medially and laterally.  Negative pivot shift.  Homans' sign is negative.  Calves are soft.  Knee flexion and extension is 4/5 and limited by the pain.     Diagnosis  Problem List Items Addressed This Visit       Lumbar spinal stenosis    Relevant Medications    acetaminophen-codeine (Tylenol w/ Codeine #3) 300-30 mg tablet    Osteoarthritis of knees, bilateral    Relevant Medications    acetaminophen-codeine (Tylenol w/  Codeine #3) 300-30 mg tablet    Osteoarthritis of knee    Relevant Medications    acetaminophen-codeine (Tylenol w/ Codeine #3) 300-30 mg tablet    Muscle weakness    Impingement syndrome of shoulder region     Other Visit Diagnoses       Long term current use of opiate analgesic    -  Primary    Relevant Medications    acetaminophen-codeine (Tylenol w/ Codeine #3) 300-30 mg tablet    Other Relevant Orders    Opiate/Opioid/Benzo Prescription Compliance             Plan  Reviewed the pain generators.  Went over the types of pain with neuropathic and nociceptive and different pathologies and therapeutic modalities. Discussed the mechanism of action of interventions from acupuncture, physical therapy , regular exercises, injections, botox, spinal cord stimulation, and role of surgery     Went over pathology of the intervertebral disc displacement and the anatomical relation to the Nerve roots and relation to the radicular symptoms. Went over treatment modalities with conservative treatment including acupuncture   and epidural steroid injection with fluoroscopy guidance and last resort of surgery    Based on the above findings and the clinical response to the opioids medications and improvement of the activities of daily living, sleep, and work performance. We made this complex decision to continue the opioids therapy in light of the evidence of the patient's responsibility in using the pain medications as prescribed for the nonmalignant chronic pain condition. We discussed about the use of the pain medications to treat the symptoms of chronic nonmalignant pain and we are not trying the repair the permanent damage in the tissues, rather we are trying to control the symptoms induced by the permanent damage to the tissues inducing the chronic pain condition and resulting disability. I explained the difference and discussed it with the patient and stressed the importance of knowing the difference especially because of the  potential side effects and the potential addicting effect and habit forming nature of the dangerous drugs we are using to treat the symptoms of the chronic pain.      We discussed that we are prescribing the medications on good luigi and legitimate medical reason.     We reviewed the side effects and precautions of opioids prescriptions as discussed in the opioids treatment agreement.    realizes the interaction between the therapeutic classes including the respiratory depression and potential death     Random drug testing twice in 6 months we will submit     Long discussion about putting effort in cutting back on pain medications. Discussed about pain level changing and we do not know if the medications at this amount are still needed until we try and cut back slowly on pain medications. If the cut is tolerated then we continue. If cut not tolerated then, will go back on the pain medications level.  The goal from this is to keep the pain medications at the lowest effective dose.   I discussed about  about considering increasing the dose of the long acting and cut back the number of doses of the short acting medications. That will decrease the number of doses being dispensed daily.       Discussed about NSAIDS and I explained about the opioids sparing effect to allow keeping the opioids dose at minimal effective dose.   I went over the potential side effects of the NSAIDS on the gastrointestinal, renal and cardiovascular systems.      I detailed the side effects from the acetaminophen in the medication and made aware of those. I also explained about the cumulative effects on the organs and mainly the liver.     Given the opioids therapy , we discussed about the risk for accidental over dose on the pain medications, either for patient or other household. I went over the mechanism of action and mode of use of the Naloxone according to the  recommendations. I will provide a prescription for a kit.      Follow-up 8 weeks or earlier if needed     The level of clinical decision making in this office visit,  is high, given the high risks of complications with the morbidity and mortality due to the fact that acute and chronic pain may pose a threat to life and bodily function, if under treated, poorly treated, or with failure to maintain adequate treatment and timely medical follow up. Additionally over treatment has its own set of complications including overdosing on the pain medications and also the habit forming potentials with the use of the medications used to treat chronic painful conditions including therapeutic classes classified as dangerous medications. Given the serious and fluctuating nature of pain level and instensity with extensive consideration for whenever pain changes, there is always the risk of prolonged functional impairment requiring close patient monitoring with regular assessments and reassessments and high level medical decision making at every office visit. The amount and complexity of data reviewed is high given the patient clinical presentation, labs,  data, radiology reports, and other tests as discussed during office visits. Pertinent data whether positive or negative were taken in consideration in the process of making this high level medical decision.

## 2024-05-08 ENCOUNTER — LAB (OUTPATIENT)
Dept: LAB | Facility: LAB | Age: 79
End: 2024-05-08
Payer: COMMERCIAL

## 2024-05-08 DIAGNOSIS — N18.32 CHRONIC KIDNEY DISEASE (CKD) STAGE G3B/A1, MODERATELY DECREASED GLOMERULAR FILTRATION RATE (GFR) BETWEEN 30-44 ML/MIN/1.73 SQUARE METER AND ALBUMINURIA CREATININE RATIO LESS THAN 30 MG/G (CMS* (MULTI): ICD-10-CM

## 2024-05-08 DIAGNOSIS — E03.9 ACQUIRED HYPOTHYROIDISM: ICD-10-CM

## 2024-05-08 DIAGNOSIS — I50.9 CONGESTIVE HEART FAILURE, UNSPECIFIED HF CHRONICITY, UNSPECIFIED HEART FAILURE TYPE (MULTI): ICD-10-CM

## 2024-05-08 DIAGNOSIS — Z79.891 LONG TERM CURRENT USE OF OPIATE ANALGESIC: ICD-10-CM

## 2024-05-08 DIAGNOSIS — E78.00 HYPERCHOLESTEROLEMIA: ICD-10-CM

## 2024-05-08 LAB
ALBUMIN SERPL BCP-MCNC: 4.1 G/DL (ref 3.4–5)
ALP SERPL-CCNC: 118 U/L (ref 33–136)
ALT SERPL W P-5'-P-CCNC: 11 U/L (ref 7–45)
AMPHETAMINES UR QL SCN: NORMAL
ANION GAP SERPL CALC-SCNC: 13 MMOL/L (ref 10–20)
AST SERPL W P-5'-P-CCNC: 15 U/L (ref 9–39)
BARBITURATES UR QL SCN: NORMAL
BASOPHILS # BLD AUTO: 0.11 X10*3/UL (ref 0–0.1)
BASOPHILS NFR BLD AUTO: 1.1 %
BILIRUB SERPL-MCNC: 0.5 MG/DL (ref 0–1.2)
BUN SERPL-MCNC: 18 MG/DL (ref 6–23)
BZE UR QL SCN: NORMAL
CALCIUM SERPL-MCNC: 9.2 MG/DL (ref 8.6–10.6)
CANNABINOIDS UR QL SCN: NORMAL
CHLORIDE SERPL-SCNC: 102 MMOL/L (ref 98–107)
CHOLEST SERPL-MCNC: 159 MG/DL (ref 0–199)
CHOLESTEROL/HDL RATIO: 2.8
CO2 SERPL-SCNC: 29 MMOL/L (ref 21–32)
CREAT SERPL-MCNC: 1.24 MG/DL (ref 0.5–1.05)
CREAT UR-MCNC: 209.5 MG/DL (ref 20–320)
EGFRCR SERPLBLD CKD-EPI 2021: 45 ML/MIN/1.73M*2
EOSINOPHIL # BLD AUTO: 1.17 X10*3/UL (ref 0–0.4)
EOSINOPHIL NFR BLD AUTO: 12.1 %
ERYTHROCYTE [DISTWIDTH] IN BLOOD BY AUTOMATED COUNT: 13.3 % (ref 11.5–14.5)
GLUCOSE SERPL-MCNC: 97 MG/DL (ref 74–99)
HCT VFR BLD AUTO: 37.2 % (ref 36–46)
HDLC SERPL-MCNC: 56.5 MG/DL
HGB BLD-MCNC: 11.8 G/DL (ref 12–16)
IMM GRANULOCYTES # BLD AUTO: 0.04 X10*3/UL (ref 0–0.5)
IMM GRANULOCYTES NFR BLD AUTO: 0.4 % (ref 0–0.9)
LDLC SERPL CALC-MCNC: 84 MG/DL
LYMPHOCYTES # BLD AUTO: 2.63 X10*3/UL (ref 0.8–3)
LYMPHOCYTES NFR BLD AUTO: 27.1 %
MCH RBC QN AUTO: 29.6 PG (ref 26–34)
MCHC RBC AUTO-ENTMCNC: 31.7 G/DL (ref 32–36)
MCV RBC AUTO: 93 FL (ref 80–100)
MONOCYTES # BLD AUTO: 0.74 X10*3/UL (ref 0.05–0.8)
MONOCYTES NFR BLD AUTO: 7.6 %
NEUTROPHILS # BLD AUTO: 5.01 X10*3/UL (ref 1.6–5.5)
NEUTROPHILS NFR BLD AUTO: 51.7 %
NON HDL CHOLESTEROL: 103 MG/DL (ref 0–149)
NRBC BLD-RTO: 0 /100 WBCS (ref 0–0)
PCP UR QL SCN: NORMAL
PLATELET # BLD AUTO: 352 X10*3/UL (ref 150–450)
POTASSIUM SERPL-SCNC: 4.2 MMOL/L (ref 3.5–5.3)
PROT SERPL-MCNC: 6.5 G/DL (ref 6.4–8.2)
RBC # BLD AUTO: 3.99 X10*6/UL (ref 4–5.2)
SODIUM SERPL-SCNC: 140 MMOL/L (ref 136–145)
TRIGL SERPL-MCNC: 92 MG/DL (ref 0–149)
TSH SERPL-ACNC: 0.86 MIU/L (ref 0.44–3.98)
VLDL: 18 MG/DL (ref 0–40)
WBC # BLD AUTO: 9.7 X10*3/UL (ref 4.4–11.3)

## 2024-05-08 PROCEDURE — 80307 DRUG TEST PRSMV CHEM ANLYZR: CPT

## 2024-05-08 PROCEDURE — 82570 ASSAY OF URINE CREATININE: CPT

## 2024-05-08 PROCEDURE — 85025 COMPLETE CBC W/AUTO DIFF WBC: CPT

## 2024-05-08 PROCEDURE — 80358 DRUG SCREENING METHADONE: CPT

## 2024-05-08 PROCEDURE — 80361 OPIATES 1 OR MORE: CPT

## 2024-05-08 PROCEDURE — 36415 COLL VENOUS BLD VENIPUNCTURE: CPT

## 2024-05-08 PROCEDURE — 84443 ASSAY THYROID STIM HORMONE: CPT

## 2024-05-08 PROCEDURE — 80365 DRUG SCREENING OXYCODONE: CPT

## 2024-05-08 PROCEDURE — 80373 DRUG SCREENING TRAMADOL: CPT

## 2024-05-08 PROCEDURE — 80346 BENZODIAZEPINES1-12: CPT

## 2024-05-08 PROCEDURE — 80354 DRUG SCREENING FENTANYL: CPT

## 2024-05-08 PROCEDURE — 80368 SEDATIVE HYPNOTICS: CPT

## 2024-05-08 PROCEDURE — 80053 COMPREHEN METABOLIC PANEL: CPT

## 2024-05-08 PROCEDURE — 80061 LIPID PANEL: CPT

## 2024-05-10 LAB
1OH-MIDAZOLAM UR CFM-MCNC: <25 NG/ML
6MAM UR CFM-MCNC: <25 NG/ML
7AMINOCLONAZEPAM UR CFM-MCNC: <25 NG/ML
A-OH ALPRAZ UR CFM-MCNC: <25 NG/ML
ALPRAZ UR CFM-MCNC: <25 NG/ML
CHLORDIAZEP UR CFM-MCNC: <25 NG/ML
CLONAZEPAM UR CFM-MCNC: <25 NG/ML
CODEINE UR CFM-MCNC: >2500 NG/ML
DIAZEPAM UR CFM-MCNC: <25 NG/ML
EDDP UR CFM-MCNC: <25 NG/ML
FENTANYL UR CFM-MCNC: <2.5 NG/ML
HYDROCODONE CTO UR CFM-MCNC: <25 NG/ML
HYDROMORPHONE UR CFM-MCNC: 109 NG/ML
LORAZEPAM UR CFM-MCNC: <25 NG/ML
METHADONE UR CFM-MCNC: <25 NG/ML
MIDAZOLAM UR CFM-MCNC: <25 NG/ML
MORPHINE UR CFM-MCNC: >2500 NG/ML
NORDIAZEPAM UR CFM-MCNC: <25 NG/ML
NORFENTANYL UR CFM-MCNC: <2.5 NG/ML
NORHYDROCODONE UR CFM-MCNC: 216 NG/ML
NOROXYCODONE UR CFM-MCNC: <25 NG/ML
NORTRAMADOL UR-MCNC: <50 NG/ML
OXAZEPAM UR CFM-MCNC: <25 NG/ML
OXYCODONE UR CFM-MCNC: <25 NG/ML
OXYMORPHONE UR CFM-MCNC: <25 NG/ML
TEMAZEPAM UR CFM-MCNC: <25 NG/ML
TRAMADOL UR CFM-MCNC: <50 NG/ML
ZOLPIDEM UR CFM-MCNC: <25 NG/ML
ZOLPIDEM UR-MCNC: <25 NG/ML

## 2024-06-18 ENCOUNTER — APPOINTMENT (OUTPATIENT)
Dept: PAIN MEDICINE | Facility: CLINIC | Age: 79
End: 2024-06-18
Payer: COMMERCIAL

## 2024-06-18 ENCOUNTER — APPOINTMENT (OUTPATIENT)
Dept: LAB | Facility: LAB | Age: 79
End: 2024-06-18
Payer: COMMERCIAL

## 2024-06-18 DIAGNOSIS — M17.0 PRIMARY OSTEOARTHRITIS OF BOTH KNEES: ICD-10-CM

## 2024-06-18 DIAGNOSIS — M48.062 SPINAL STENOSIS OF LUMBAR REGION WITH NEUROGENIC CLAUDICATION: ICD-10-CM

## 2024-06-18 DIAGNOSIS — M47.816 SPONDYLOSIS OF LUMBAR SPINE: Primary | ICD-10-CM

## 2024-06-18 DIAGNOSIS — Z79.891 LONG TERM CURRENT USE OF OPIATE ANALGESIC: ICD-10-CM

## 2024-06-18 PROCEDURE — 99214 OFFICE O/P EST MOD 30 MIN: CPT | Performed by: PHYSICAL MEDICINE & REHABILITATION

## 2024-06-18 RX ORDER — ACETAMINOPHEN AND CODEINE PHOSPHATE 300; 30 MG/1; MG/1
1 TABLET ORAL EVERY 8 HOURS PRN
Qty: 90 TABLET | Refills: 1 | Status: SHIPPED | OUTPATIENT
Start: 2024-06-25

## 2024-06-18 RX ORDER — ACETAMINOPHEN AND CODEINE PHOSPHATE 300; 30 MG/1; MG/1
1 TABLET ORAL EVERY 8 HOURS PRN
Qty: 90 TABLET | Refills: 1 | Status: SHIPPED | OUTPATIENT
Start: 2024-06-24 | End: 2024-06-18 | Stop reason: SDUPTHER

## 2024-06-18 NOTE — PROGRESS NOTES
Chief complaint  Back pain     History  Amira Alex is back for pain management office visit  Continue with back pain   Hesitant about injeciton  The pain in the back is deep on both sides.  The pain is above the belt line, it is continuous but it gets worse with extension of the lumbar spine to either side. The pain improves with leaning forward.  Extension combined with rotation to the either side worsens the pain.  There are no reported radiation of the pain to the lower limbs.     No bowel or bladder incontinence.  No sensory or motor changes in the lower limbs.    No changes or in the color or textures of the skin of the lower limbs.     Long discussion about putting effort in cutting back on pain medications. Discussed about pain level changing and we do not know if the medications at this amount are still needed until we try and cut back slowly on pain medications. If the cut is tolerated then we continue. If cut not tolerated then, will go back on the pain medications level.  The goal from this is to keep the pain medications at the lowest effective dose.       Pain level without medication is 7/10 , with the medication pain level 1/10.     The pain meds are helping control the pain and improving Activities of Daily living and quality of life and quality of sleep.    opioids treatment agreement Jan 2024  Pill count today, using count tray, and in front of patient :  38    pills , last fill was on 5/25  for 90 tabs,  the count is correct  Oarrs pulled and reviewed, no concerns  last urine toxicology testing earlier this year and it was compliant we will repeat  Xray updated spine   ORT Score is  0  Pain pathology and pain generators spine   Modalities tried injection, surgery, physical therapy, TENS unit, nonsteroidal anti-inflammatory medication       Review of Systems :  Denied any fever or chills. No weight loss and no night sweats. No cough or sputum production. No diarrhea   The constipation has  been responding to fibers and over the counter medications.     No bladder and bowel incontinence and no other changes in bladder and bowel. No skin changes.  Reports tiredness and fatigability only if the pain is not controlled.   Denied opioids diversion and abuse and denies alcoholism. Denies overuse of the pain medications.    The control of the pain with the pain medications is helping the control of the symptoms and allowing the function and activities of daily living, enjoyment of life, improving the quality of life and sleep with less interruption by the pain. The goal is symptomatic control of the nonmalignant chronic pain and not to repair the permanent damage in the tissues inducing the chronic pain conditions. We are aiming to shift the focus from the nonmalignant chronic pain to other aspects of life by symptomatically treating this chronic pain. If this pain is not treated it will lead to major morbidity and it is also associated with increased risks of mortality. The patient understands those very clearly and also understand high risks of morbidity and mortality if not strictly adherent to the treatment recommendations and reporting any associated side effects. Also patient understand the full responsibility associated with these medications to avoid abuse or overuse or any use of these medications for anything besides treating the patient's own chronic pain and nothing else under any circumstances.        Physical examination  Awake, alert and oriented for time place and persons   declined Chaperone for the visit and was adequately  draped for the exam.  Examination of the lumbar spine showed mild reversal of the lumbar lordosis .    Tight muscle bands over the   lower lumbar paraspinals area.  Batista test on the   lower lumbar area increases the pain with stabilization of the lower lumbar facets bilaterally at  L45 and L5S1,  combined with extension and rotation of the lumbar spine to the eith side  reproduced and worsened the back pain on that side.  The pain improved with leaning forward.  Straight leg raising was negative.  No sensorimotor deficits in the lower limbs.  Atilio testing were negative for axial loading and log rotation.  No aberrant pain behavior.      Diagnosis  Problem List Items Addressed This Visit       Lumbar spinal stenosis    Relevant Medications    acetaminophen-codeine (Tylenol w/ Codeine #3) 300-30 mg tablet (Start on 6/25/2024)    Spondylosis of lumbar spine - Primary    Osteoarthritis of knee    Relevant Medications    acetaminophen-codeine (Tylenol w/ Codeine #3) 300-30 mg tablet (Start on 6/25/2024)     Other Visit Diagnoses       Long term current use of opiate analgesic        Relevant Medications    acetaminophen-codeine (Tylenol w/ Codeine #3) 300-30 mg tablet (Start on 6/25/2024)             Plan  Reviewed the pain generators.  Went over the types of pain with neuropathic and nociceptive and different pathologies and therapeutic modalities. Discussed the mechanism of action of interventions from acupuncture, physical therapy , regular exercises, injections, botox, spinal cord stimulation, and role of surgery     Went over pathology of the intervertebral disc displacement and the anatomical relation to the Nerve roots and relation to the radicular symptoms. Went over treatment modalities with conservative treatment including acupuncture   and epidural steroid injection with fluoroscopy guidance and last resort of surgery    Based on the above findings and the clinical response to the opioids medications and improvement of the activities of daily living, sleep, and work performance. We made this complex decision to continue the opioids therapy in light of the evidence of the patient's responsibility in using the pain medications as prescribed for the nonmalignant chronic pain condition. We discussed about the use of the pain medications to treat the symptoms of chronic  nonmalignant pain and we are not trying the repair the permanent damage in the tissues, rather we are trying to control the symptoms induced by the permanent damage to the tissues inducing the chronic pain condition and resulting disability. I explained the difference and discussed it with the patient and stressed the importance of knowing the difference especially because of the potential side effects and the potential addicting effect and habit forming nature of the dangerous drugs we are using to treat the symptoms of the chronic pain.      We discussed that we are prescribing the medications on good luigi and legitimate medical reason.     We reviewed the side effects and precautions of opioids prescriptions as discussed in the opioids treatment agreement.    realizes the interaction between the therapeutic classes including the respiratory depression and potential death     Random drug testing   we will submit     Continue with pain     Discussed about NSAIDS and I explained about the opioids sparing effect to allow keeping the opioids dose at minimal effective dose.   I went over the potential side effects of the NSAIDS on the gastrointestinal, renal and cardiovascular systems.      I detailed the side effects from the acetaminophen in the medication and made aware of those. I also explained about the cumulative effects on the organs and mainly the liver.     Given the opioids therapy , we discussed about the risk for accidental over dose on the pain medications, either for patient or other household. I went over the mechanism of action and mode of use of the Naloxone according to the  recommendations. I will provide a prescription for a kit.     Follow-up 8 weeks or earlier if needed     The level of clinical decision making in this office visit,  is high, given the high risks of complications with the morbidity and mortality due to the fact that acute and chronic pain may pose a threat to life  and bodily function, if under treated, poorly treated, or with failure to maintain adequate treatment and timely medical follow up. Additionally over treatment has its own set of complications including overdosing on the pain medications and also the habit forming potentials with the use of the medications used to treat chronic painful conditions including therapeutic classes classified as dangerous medications. Given the serious and fluctuating nature of pain level and instensity with extensive consideration for whenever pain changes, there is always the risk of prolonged functional impairment requiring close patient monitoring with regular assessments and reassessments and high level medical decision making at every office visit. The amount and complexity of data reviewed is high given the patient clinical presentation, labs,  data, radiology reports, and other tests as discussed during office visits. Pertinent data whether positive or negative were taken in consideration in the process of making this high level medical decision.

## 2024-06-26 ENCOUNTER — APPOINTMENT (OUTPATIENT)
Dept: PRIMARY CARE | Facility: CLINIC | Age: 79
End: 2024-06-26
Payer: COMMERCIAL

## 2024-07-24 ENCOUNTER — APPOINTMENT (OUTPATIENT)
Dept: PRIMARY CARE | Facility: CLINIC | Age: 79
End: 2024-07-24
Payer: COMMERCIAL

## 2024-07-24 VITALS
DIASTOLIC BLOOD PRESSURE: 62 MMHG | SYSTOLIC BLOOD PRESSURE: 90 MMHG | BODY MASS INDEX: 26.05 KG/M2 | WEIGHT: 147 LBS | HEIGHT: 63 IN | OXYGEN SATURATION: 91 % | HEART RATE: 70 BPM

## 2024-07-24 DIAGNOSIS — N18.31 STAGE 3A CHRONIC KIDNEY DISEASE (MULTI): ICD-10-CM

## 2024-07-24 DIAGNOSIS — Z78.0 POST-MENOPAUSE: ICD-10-CM

## 2024-07-24 DIAGNOSIS — E78.00 HYPERCHOLESTEROLEMIA: ICD-10-CM

## 2024-07-24 DIAGNOSIS — E03.9 ACQUIRED HYPOTHYROIDISM: ICD-10-CM

## 2024-07-24 DIAGNOSIS — Z00.00 MEDICARE ANNUAL WELLNESS VISIT, SUBSEQUENT: Primary | ICD-10-CM

## 2024-07-24 DIAGNOSIS — D22.30 CHANGE IN FACIAL MOLE: ICD-10-CM

## 2024-07-24 DIAGNOSIS — K21.9 GASTROESOPHAGEAL REFLUX DISEASE WITHOUT ESOPHAGITIS: ICD-10-CM

## 2024-07-24 DIAGNOSIS — I50.9 CONGESTIVE HEART FAILURE, UNSPECIFIED HF CHRONICITY, UNSPECIFIED HEART FAILURE TYPE (MULTI): ICD-10-CM

## 2024-07-24 DIAGNOSIS — Z00.00 ROUTINE GENERAL MEDICAL EXAMINATION AT HEALTH CARE FACILITY: ICD-10-CM

## 2024-07-24 PROCEDURE — 1036F TOBACCO NON-USER: CPT | Performed by: FAMILY MEDICINE

## 2024-07-24 PROCEDURE — G0439 PPPS, SUBSEQ VISIT: HCPCS | Performed by: FAMILY MEDICINE

## 2024-07-24 PROCEDURE — 99214 OFFICE O/P EST MOD 30 MIN: CPT | Performed by: FAMILY MEDICINE

## 2024-07-24 PROCEDURE — 1159F MED LIST DOCD IN RCRD: CPT | Performed by: FAMILY MEDICINE

## 2024-07-24 PROCEDURE — 1123F ACP DISCUSS/DSCN MKR DOCD: CPT | Performed by: FAMILY MEDICINE

## 2024-07-24 PROCEDURE — 1158F ADVNC CARE PLAN TLK DOCD: CPT | Performed by: FAMILY MEDICINE

## 2024-07-24 PROCEDURE — 1170F FXNL STATUS ASSESSED: CPT | Performed by: FAMILY MEDICINE

## 2024-07-24 RX ORDER — LEVOTHYROXINE SODIUM 88 UG/1
88 TABLET ORAL
Qty: 90 TABLET | Refills: 3 | Status: SHIPPED | OUTPATIENT
Start: 2024-07-24

## 2024-07-24 RX ORDER — ATORVASTATIN CALCIUM 40 MG/1
40 TABLET, FILM COATED ORAL NIGHTLY
COMMUNITY
Start: 2024-05-11 | End: 2024-07-24 | Stop reason: SDUPTHER

## 2024-07-24 RX ORDER — OMEPRAZOLE 40 MG/1
40 CAPSULE, DELAYED RELEASE ORAL DAILY
Qty: 90 CAPSULE | Refills: 3 | Status: SHIPPED | OUTPATIENT
Start: 2024-07-24

## 2024-07-24 RX ORDER — ATORVASTATIN CALCIUM 40 MG/1
40 TABLET, FILM COATED ORAL NIGHTLY
Qty: 90 TABLET | Refills: 1 | Status: SHIPPED | OUTPATIENT
Start: 2024-07-24

## 2024-07-24 RX ORDER — IBUPROFEN 100 MG/5ML
1000 SUSPENSION, ORAL (FINAL DOSE FORM) ORAL
COMMUNITY

## 2024-07-24 RX ORDER — SPIRONOLACTONE 25 MG/1
25 TABLET ORAL 2 TIMES DAILY
Qty: 180 TABLET | Refills: 1 | Status: SHIPPED | OUTPATIENT
Start: 2024-07-24

## 2024-07-24 ASSESSMENT — PATIENT HEALTH QUESTIONNAIRE - PHQ9
SUM OF ALL RESPONSES TO PHQ9 QUESTIONS 1 AND 2: 0
2. FEELING DOWN, DEPRESSED OR HOPELESS: NOT AT ALL
1. LITTLE INTEREST OR PLEASURE IN DOING THINGS: NOT AT ALL

## 2024-07-24 ASSESSMENT — ACTIVITIES OF DAILY LIVING (ADL)
MANAGING_FINANCES: INDEPENDENT
BATHING: INDEPENDENT
TAKING_MEDICATION: INDEPENDENT
DOING_HOUSEWORK: INDEPENDENT
DRESSING: INDEPENDENT
GROCERY_SHOPPING: INDEPENDENT

## 2024-07-24 ASSESSMENT — ENCOUNTER SYMPTOMS
LOSS OF SENSATION IN FEET: 0
DEPRESSION: 0
OCCASIONAL FEELINGS OF UNSTEADINESS: 0

## 2024-07-24 NOTE — PROGRESS NOTES
"Subjective   Patient ID: Amira Alex is a 78 y.o. female who presents for Medicare Annual Wellness Visit Subsequent ([facility]).    Pt is doing ok  Sees cardiology       Has a mole on cheek getting larger  Needs new nephrologist  Labs were good except sl anemia  Review of Systems   All other systems reviewed and are negative.      Objective   BP 90/62   Pulse 70   Ht 1.6 m (5' 3\")   Wt 66.7 kg (147 lb)   SpO2 91%   BMI 26.04 kg/m²     Physical Exam  Constitutional:       Appearance: Normal appearance.   HENT:      Head: Normocephalic and atraumatic.      Right Ear: Tympanic membrane, ear canal and external ear normal.      Left Ear: Tympanic membrane, ear canal and external ear normal.      Nose: Nose normal.      Mouth/Throat:      Mouth: Mucous membranes are moist.      Pharynx: Oropharynx is clear.   Eyes:      Extraocular Movements: Extraocular movements intact.      Conjunctiva/sclera: Conjunctivae normal.      Pupils: Pupils are equal, round, and reactive to light.   Cardiovascular:      Rate and Rhythm: Normal rate and regular rhythm.      Pulses: Normal pulses.      Heart sounds: Normal heart sounds.   Pulmonary:      Effort: Pulmonary effort is normal.      Breath sounds: Normal breath sounds.   Abdominal:      General: Abdomen is flat. Bowel sounds are normal.      Palpations: Abdomen is soft.   Musculoskeletal:         General: Normal range of motion.      Cervical back: Normal range of motion and neck supple.   Skin:     General: Skin is warm and dry.      Capillary Refill: Capillary refill takes less than 2 seconds.   Neurological:      General: No focal deficit present.      Mental Status: She is alert and oriented to person, place, and time.   Psychiatric:         Mood and Affect: Mood normal.         Behavior: Behavior normal.         Thought Content: Thought content normal.         Assessment/Plan   Diagnoses and all orders for this visit:  Medicare annual wellness visit, " subsequent  Post-menopause  -     XR DEXA bone density; Future  Stage 3a chronic kidney disease (Multi)  -     Albumin-Creatinine Ratio, Urine Random; Future  -     Referral to Nephrology; Future  Routine general medical examination at health care facility  Acquired hypothyroidism  -     levothyroxine (Synthroid, Levoxyl) 88 mcg tablet; Take 1 tablet (88 mcg) by mouth once daily in the morning. Take before meals.  Gastroesophageal reflux disease without esophagitis  -     omeprazole (PriLOSEC) 40 mg DR capsule; Take 1 capsule (40 mg) by mouth once daily.  Congestive heart failure, unspecified HF chronicity, unspecified heart failure type (Multi)  -     spironolactone (Aldactone) 25 mg tablet; Take 1 tablet (25 mg) by mouth 2 times a day.  Hypercholesterolemia  -     atorvastatin (Lipitor) 40 mg tablet; Take 1 tablet (40 mg) by mouth once daily at bedtime.  Change in facial mole  -     Referral to Dermatology       Medicare Wellness Billing Compliance Satisfied    *This is a visual tool to show completion of required items on the day of the visit. Green checks will only appear on the date of visit.    Review all medications by prescribing practitioner or clinical pharmacist (such as prescriptions, OTCs, herbal therapies and supplements) documented in the medical record    Past Medical, Surgical, and Family History reviewed and updated in chart    Tobacco Use Reviewed    Alcohol Use Reviewed    Illicit Drug Use Reviewed    PHQ2/9    Falls in Last Year Reviewed    Home Safety Risk Factors Reviewed    Cognitive Impairment Reviewed    Patient Self Assessment and Health Status    Current Diet Reviewed    Exercise Frequency    ADL - Hearing Impairment    ADL - Bathing    ADL - Dressing    ADL - Walks in Home    IADL - Managing Finances    IADL - Grocery Shopping    IADL - Taking Medications    IADL - Doing Housework

## 2024-08-20 ENCOUNTER — APPOINTMENT (OUTPATIENT)
Dept: PAIN MEDICINE | Facility: CLINIC | Age: 79
End: 2024-08-20
Payer: COMMERCIAL

## 2024-08-20 DIAGNOSIS — M47.816 SPONDYLOSIS OF LUMBAR SPINE: ICD-10-CM

## 2024-08-20 DIAGNOSIS — M17.0 PRIMARY OSTEOARTHRITIS OF BOTH KNEES: ICD-10-CM

## 2024-08-20 DIAGNOSIS — Z79.891 LONG TERM CURRENT USE OF OPIATE ANALGESIC: ICD-10-CM

## 2024-08-20 DIAGNOSIS — M48.062 SPINAL STENOSIS OF LUMBAR REGION WITH NEUROGENIC CLAUDICATION: Primary | ICD-10-CM

## 2024-08-20 PROCEDURE — 99214 OFFICE O/P EST MOD 30 MIN: CPT | Performed by: PHYSICAL MEDICINE & REHABILITATION

## 2024-08-20 RX ORDER — ACETAMINOPHEN AND CODEINE PHOSPHATE 300; 30 MG/1; MG/1
1 TABLET ORAL EVERY 8 HOURS PRN
Qty: 90 TABLET | Refills: 1 | Status: SHIPPED | OUTPATIENT
Start: 2024-08-23

## 2024-08-20 NOTE — PROGRESS NOTES
"Chief complaint  Back and lower limbs pain     History  Amira Alex is back for pain management office visit  Continue with pain and helping with meds   Long discussion about putting effort in cutting back on pain medications. Discussed about pain level changing and we do not know if the medications at this amount are still needed until we try and cut back slowly on pain medications. If the cut is tolerated then we continue. If cut not tolerated then, will go back on the pain medications level.  The goal from this is to keep the pain medications at the lowest effective dose.   realizes the interaction between the therapeutic classes including the respiratory depression and potential death       Pain level without medication is 8/10 , with the medication pain level 3/10.     The pain meds are helping control the pain and improving Activities of Daily living and quality of life and quality of sleep.    opioids treatment agreement Jan 2024  Pill count today, using count tray, and in front of patient :  32    pills , last fill was on 7/26  for 90 tabs,  the count is correct  Oarrs pulled and reviewed, no concerns  last urine toxicology testing earlier this year and it was compliant we will repeat  Xray updated spine   ORT Score is   0  Pain pathology and pain generators spine   Modalities tried injection, surgery, physical therapy, TENS unit, nonsteroidal anti-inflammatory medication       Review of Systems :  Denied any fever or chills. No weight loss and no night sweats. No cough or sputum production. No diarrhea   The constipation has been responding to fibers and over the counter medications.     No bladder and bowel incontinence and no other changes in bladder and bowel. No skin changes.  Reports tiredness and fatigability only if the pain is not controlled.     Denied opioids diversion and abuse and denies alcoholism. Denies overuse of the pain medications.  No reported euphoria sensation or getting a \"high\" " on the pain medications.    The control of the pain with the pain medications is helping the control of the symptoms and allowing the function and activities of daily living, enjoyment of life, improving the quality of life and sleep with less interruption by the pain. The goal is symptomatic control of the nonmalignant chronic pain and not to repair the permanent damage in the tissues inducing the chronic pain conditions. We are aiming to shift the focus from the nonmalignant chronic pain to other aspects of life by symptomatically treating this chronic pain. If this pain is not treated it will lead to major morbidity and it is also associated with increased risks of mortality. The patient understands those very clearly and also understand high risks of morbidity and mortality if not strictly adherent to the treatment recommendations and reporting any associated side effects. Also patient understand the full responsibility associated with these medications to avoid abuse or overuse or any use of these medications for anything besides treating the patient's own chronic pain and nothing else under any circumstances.        Physical examination  Awake, alert and oriented for time place and persons   declined Chaperone for the visit and was adequately  draped for the exam.    There is decreased sensory to light touch on the lateral aspects of the thighs and around the   knee going down to the lateral aspect of the legs to the   lateral malleoli into the dorsum of the feet..    Deep tendon reflexes is present for the patellar tendons bilaterally.  Achilles reflexes are present bilaterally and symmetric.    Medial Hamstrings reflex is decreased bilaterally  Plantar cutaneous are downgoing.  Ankle dorsiflexion is 5/5 bilaterally.  Plantar flexion of the ankles are 5/5 bilaterally.  Big toe extension is 4/5 bilaterally  Negative Tinel's sign over the right peroneal nerve at the fibular neck.  Atilio sign for axial loading  and global rotation are negative.  No aberrant pain behavior.     Diagnosis  Problem List Items Addressed This Visit       Lumbar spinal stenosis - Primary    Relevant Medications    acetaminophen-codeine (Tylenol w/ Codeine #3) 300-30 mg tablet (Start on 8/23/2024)    Spondylosis of lumbar spine    Osteoarthritis of knee    Relevant Medications    acetaminophen-codeine (Tylenol w/ Codeine #3) 300-30 mg tablet (Start on 8/23/2024)     Other Visit Diagnoses       Long term current use of opiate analgesic        Relevant Medications    acetaminophen-codeine (Tylenol w/ Codeine #3) 300-30 mg tablet (Start on 8/23/2024)             Plan  Reviewed the pain generators.  Went over the types of pain with neuropathic and nociceptive and different pathologies and therapeutic modalities. Discussed the mechanism of action of interventions from acupuncture, physical therapy , regular exercises, injections, botox, spinal cord stimulation, and role of surgery     Went over pathology of the intervertebral disc displacement and the anatomical relation to the Nerve roots and relation to the radicular symptoms. Went over treatment modalities with conservative treatment including acupuncture   and epidural steroid injection with fluoroscopy guidance and last resort of surgery    Based on the above findings and the clinical response to the opioids medications and improvement of the activities of daily living, sleep, and work performance. We made this complex decision to continue the opioids therapy in light of the evidence of the patient's responsibility in using the pain medications as prescribed for the nonmalignant chronic pain condition. We discussed about the use of the pain medications to treat the symptoms of chronic nonmalignant pain and we are not trying the repair the permanent damage in the tissues, rather we are trying to control the symptoms induced by the permanent damage to the tissues inducing the chronic pain condition  and resulting disability. I explained the difference and discussed it with the patient and stressed the importance of knowing the difference especially because of the potential side effects and the potential addicting effect and habit forming nature of the dangerous drugs we are using to treat the symptoms of the chronic pain.      We discussed that we are prescribing the medications on good luigi and legitimate medical reason.     We reviewed the side effects and precautions of opioids prescriptions as discussed in the opioids treatment agreement.    realizes the interaction between the therapeutic classes including the respiratory depression and potential death     Random drug testing   we will submit     Continue with meds  Long discussion about putting effort in cutting back on pain medications. Discussed about pain level changing and we do not know if the medications at this amount are still needed until we try and cut back slowly on pain medications. If the cut is tolerated then we continue. If cut not tolerated then, will go back on the pain medications level.  The goal from this is to keep the pain medications at the lowest effective dose.     Discussed about NSAIDS and I explained about the opioids sparing effect to allow keeping the opioids dose at minimal effective dose.   I went over the potential side effects of the NSAIDS on the gastrointestinal, renal and cardiovascular systems.      I detailed the side effects from the acetaminophen in the medication and made aware of those. I also explained about the cumulative effects on the organs and mainly the liver.     Given the opioids therapy , we discussed about the risk for accidental over dose on the pain medications, either for patient or other household. I went over the mechanism of action and mode of use of the Naloxone according to the  recommendations. I will provide a prescription for a kit.     Follow-up 8 weeks or earlier if needed      The level of clinical decision making in this office visit,  is high, given the high risks of complications with the morbidity and mortality due to the fact that acute and chronic pain may pose a threat to life and bodily function, if under treated, poorly treated, or with failure to maintain adequate treatment and timely medical follow up. Additionally over treatment has its own set of complications including overdosing on the pain medications and also the habit forming potentials with the use of the medications used to treat chronic painful conditions including therapeutic classes classified as dangerous medications. Given the serious and fluctuating nature of pain level and instensity with extensive consideration for whenever pain changes, there is always the risk of prolonged functional impairment requiring close patient monitoring with regular assessments and reassessments and high level medical decision making at every office visit. The amount and complexity of data reviewed is high given the patient clinical presentation, labs,  data, radiology reports, and other tests as discussed during office visits. Pertinent data whether positive or negative were taken in consideration in the process of making this high level medical decision.

## 2024-10-22 ENCOUNTER — APPOINTMENT (OUTPATIENT)
Dept: PAIN MEDICINE | Facility: CLINIC | Age: 79
End: 2024-10-22
Payer: COMMERCIAL

## 2024-10-22 DIAGNOSIS — M48.062 SPINAL STENOSIS OF LUMBAR REGION WITH NEUROGENIC CLAUDICATION: Primary | ICD-10-CM

## 2024-10-22 DIAGNOSIS — M47.816 SPONDYLOSIS OF LUMBAR SPINE: ICD-10-CM

## 2024-10-22 DIAGNOSIS — Z79.891 LONG TERM CURRENT USE OF OPIATE ANALGESIC: ICD-10-CM

## 2024-10-22 DIAGNOSIS — M17.0 PRIMARY OSTEOARTHRITIS OF BOTH KNEES: ICD-10-CM

## 2024-10-22 PROCEDURE — G2211 COMPLEX E/M VISIT ADD ON: HCPCS | Performed by: PHYSICAL MEDICINE & REHABILITATION

## 2024-10-22 PROCEDURE — 99214 OFFICE O/P EST MOD 30 MIN: CPT | Performed by: PHYSICAL MEDICINE & REHABILITATION

## 2024-10-22 RX ORDER — ACETAMINOPHEN AND CODEINE PHOSPHATE 300; 30 MG/1; MG/1
1 TABLET ORAL EVERY 8 HOURS PRN
Qty: 90 TABLET | Refills: 1 | Status: SHIPPED | OUTPATIENT
Start: 2024-10-26

## 2024-10-22 NOTE — PROGRESS NOTES
Chief complaint  Back pain     History  Amira Alex is back for pain management office visit  Back pain mainly with standing and walking pain gets worse  Back pain across  Going down to the lateral legs  Meds are controlling symptoms without meds The pain is interfering with activities of daily living, quality of life and quality of sleep. It is limiting the functions and everything takes longer to complete because of the slowing related to the pain. Movements are cautious to avoid aggravation of the symptoms.     Long discussion about putting effort in cutting back on pain medications. Discussed about pain level changing and we do not know if the medications at this amount are still needed until we try and cut back slowly on pain medications. If the cut is tolerated then we continue. If cut not tolerated then, will go back on the pain medications level.  The goal from this is to keep the pain medications at the lowest effective dose.       Pain level without medication is 8/10 , with the medication pain level 2/10.     The pain meds are helping control the pain and improving Activities of Daily living and quality of life and quality of sleep.    opioids treatment agreement Jan 2024  Pill count today, using count tray, and in front of patient :  15    pills , last fill was on 9/26  for 90 tabs,  the count is correct  Oarrs pulled and reviewed, no concerns  last urine toxicology testing earlier this year and it was compliant we will repeat  Xray updated spine   ORT Score is  0  Pain pathology and pain generators spine    Modalities tried injection, surgery, physical therapy, TENS unit, nonsteroidal anti-inflammatory medication       Review of Systems :  Denied any fever or chills. No weight loss and no night sweats. No cough or sputum production. No diarrhea   The constipation has been responding to fibers and over the counter medications.     No bladder and bowel incontinence and no other changes in bladder  "and bowel. No skin changes.  Reports tiredness and fatigability only if the pain is not controlled.     Denied opioids diversion and abuse and denies alcoholism. Denies overuse of the pain medications.  No reported euphoria sensation or getting a \"high\" on the pain medications.    The control of the pain with the pain medications is helping the control of the symptoms and allowing the function and activities of daily living, enjoyment of life, improving the quality of life and sleep with less interruption by the pain. The goal is symptomatic control of the nonmalignant chronic pain and not to repair the permanent damage in the tissues inducing the chronic pain conditions. We are aiming to shift the focus from the nonmalignant chronic pain to other aspects of life by symptomatically treating this chronic pain. If this pain is not treated it will lead to major morbidity and it is also associated with increased risks of mortality. The patient understands those very clearly and also understand high risks of morbidity and mortality if not strictly adherent to the treatment recommendations and reporting any associated side effects. Also patient understand the full responsibility associated with these medications to avoid abuse or overuse or any use of these medications for anything besides treating the patient's own chronic pain and nothing else under any circumstances.        Physical examination  Awake, alert and oriented for time place and persons   declined Chaperone for the visit and was adequately  draped for the exam.  There is decreased sensory to light touch on the lateral aspects of the thighs and around the   knee going down to the lateral aspect of the legs to the   lateral malleoli into the dorsum of the feet..    Deep tendon reflexes is present for the patellar tendons bilaterally.  Achilles reflexes are present bilaterally and symmetric.    Medial Hamstrings reflex is decreased bilaterally  Plantar " cutaneous are downgoing.  Ankle dorsiflexion is 5/5 bilaterally.  Plantar flexion of the ankles are 5/5 bilaterally.  Big toe extension is 4/5 bilaterally  Negative Tinel's sign over the right peroneal nerve at the fibular neck.  Atilio sign for axial loading and global rotation are negative.  No aberrant pain behavior.     Diagnosis  Problem List Items Addressed This Visit       Lumbar spinal stenosis - Primary    Relevant Medications    acetaminophen-codeine (Tylenol w/ Codeine #3) 300-30 mg tablet (Start on 10/26/2024)    Spondylosis of lumbar spine    Osteoarthritis of knee    Relevant Medications    acetaminophen-codeine (Tylenol w/ Codeine #3) 300-30 mg tablet (Start on 10/26/2024)    Long term current use of opiate analgesic    Relevant Medications    acetaminophen-codeine (Tylenol w/ Codeine #3) 300-30 mg tablet (Start on 10/26/2024)    Other Relevant Orders    Opiate/Opioid/Benzo Prescription Compliance        Plan  Reviewed the pain generators.  Went over the types of pain with neuropathic and nociceptive and different pathologies and therapeutic modalities. Discussed the mechanism of action of interventions from acupuncture, physical therapy , regular exercises, injections, botox, spinal cord stimulation, and role of surgery     Went over pathology of the intervertebral disc displacement and the anatomical relation to the Nerve roots and relation to the radicular symptoms. Went over treatment modalities with conservative treatment including acupuncture   and epidural steroid injection with fluoroscopy guidance and last resort of surgery    Based on the above findings and the clinical response to the opioids medications and improvement of the activities of daily living, sleep, and work performance. We made this complex decision to continue the opioids therapy in light of the evidence of the patient's responsibility in using the pain medications as prescribed for the nonmalignant chronic pain condition. We  discussed about the use of the pain medications to treat the symptoms of chronic nonmalignant pain and we are not trying the repair the permanent damage in the tissues, rather we are trying to control the symptoms induced by the permanent damage to the tissues inducing the chronic pain condition and resulting disability. I explained the difference and discussed it with the patient and stressed the importance of knowing the difference especially because of the potential side effects and the potential addicting effect and habit forming nature of the dangerous drugs we are using to treat the symptoms of the chronic pain.      We discussed that we are prescribing the medications on good luigi and legitimate medical reason.     We reviewed the side effects and precautions of opioids prescriptions as discussed in the opioids treatment agreement.    realizes the interaction between the therapeutic classes including the respiratory depression and potential death     Random drug testing   we will submit     Consider NALINI for aggravation    SCS for chronic pain control    Discussed about NSAIDS and I explained about the opioids sparing effect to allow keeping the opioids dose at minimal effective dose.   I went over the potential side effects of the NSAIDS on the gastrointestinal, renal and cardiovascular systems.      I detailed the side effects from the acetaminophen in the medication and made aware of those. I also explained about the cumulative effects on the organs and mainly the liver.     Given the opioids therapy , we discussed about the risk for accidental over dose on the pain medications, either for patient or other household. I went over the mechanism of action and mode of use of the Naloxone according to the  recommendations. I will provide a prescription for a kit.     Follow-up 8 weeks or earlier if needed     The level of clinical decision making in this office visit,  is high, given the high risks  of complications with the morbidity and mortality due to the fact that acute and chronic pain may pose a threat to life and bodily function, if under treated, poorly treated, or with failure to maintain adequate treatment and timely medical follow up. Additionally over treatment has its own set of complications including overdosing on the pain medications and also the habit forming potentials with the use of the medications used to treat chronic painful conditions including therapeutic classes classified as dangerous medications. Given the serious and fluctuating nature of pain level and instensity with extensive consideration for whenever pain changes, there is always the risk of prolonged functional impairment requiring close patient monitoring with regular assessments and reassessments and high level medical decision making at every office visit. The amount and complexity of data reviewed is high given the patient clinical presentation, labs,  data, radiology reports, and other tests as discussed during office visits. Pertinent data whether positive or negative were taken in consideration in the process of making this high level medical decision.

## 2024-11-26 ENCOUNTER — TELEPHONE (OUTPATIENT)
Dept: PRIMARY CARE | Facility: CLINIC | Age: 79
End: 2024-11-26
Payer: COMMERCIAL

## 2024-12-13 ENCOUNTER — LAB (OUTPATIENT)
Dept: LAB | Facility: LAB | Age: 79
End: 2024-12-13
Payer: COMMERCIAL

## 2024-12-13 DIAGNOSIS — Z79.891 LONG TERM CURRENT USE OF OPIATE ANALGESIC: ICD-10-CM

## 2024-12-13 PROCEDURE — 80361 OPIATES 1 OR MORE: CPT

## 2024-12-13 PROCEDURE — 80307 DRUG TEST PRSMV CHEM ANLYZR: CPT

## 2024-12-13 PROCEDURE — 80373 DRUG SCREENING TRAMADOL: CPT

## 2024-12-13 PROCEDURE — 80365 DRUG SCREENING OXYCODONE: CPT

## 2024-12-13 PROCEDURE — 80354 DRUG SCREENING FENTANYL: CPT

## 2024-12-13 PROCEDURE — 80368 SEDATIVE HYPNOTICS: CPT

## 2024-12-13 PROCEDURE — 82570 ASSAY OF URINE CREATININE: CPT

## 2024-12-13 PROCEDURE — 80346 BENZODIAZEPINES1-12: CPT

## 2024-12-13 PROCEDURE — 80358 DRUG SCREENING METHADONE: CPT

## 2024-12-14 LAB
AMPHETAMINES UR QL SCN: NORMAL
BARBITURATES UR QL SCN: NORMAL
BZE UR QL SCN: NORMAL
CANNABINOIDS UR QL SCN: NORMAL
CREAT UR-MCNC: 165.6 MG/DL (ref 20–320)
PCP UR QL SCN: NORMAL

## 2024-12-17 ENCOUNTER — APPOINTMENT (OUTPATIENT)
Dept: PAIN MEDICINE | Facility: CLINIC | Age: 79
End: 2024-12-17
Payer: COMMERCIAL

## 2024-12-17 VITALS
OXYGEN SATURATION: 93 % | DIASTOLIC BLOOD PRESSURE: 75 MMHG | BODY MASS INDEX: 26.89 KG/M2 | HEIGHT: 62 IN | HEART RATE: 71 BPM | SYSTOLIC BLOOD PRESSURE: 141 MMHG

## 2024-12-17 DIAGNOSIS — M48.062 SPINAL STENOSIS OF LUMBAR REGION WITH NEUROGENIC CLAUDICATION: ICD-10-CM

## 2024-12-17 DIAGNOSIS — M17.0 PRIMARY OSTEOARTHRITIS OF BOTH KNEES: ICD-10-CM

## 2024-12-17 DIAGNOSIS — Z79.891 LONG TERM CURRENT USE OF OPIATE ANALGESIC: ICD-10-CM

## 2024-12-17 PROCEDURE — 1125F AMNT PAIN NOTED PAIN PRSNT: CPT | Performed by: PHYSICAL MEDICINE & REHABILITATION

## 2024-12-17 PROCEDURE — G2211 COMPLEX E/M VISIT ADD ON: HCPCS | Performed by: PHYSICAL MEDICINE & REHABILITATION

## 2024-12-17 PROCEDURE — 99214 OFFICE O/P EST MOD 30 MIN: CPT | Performed by: PHYSICAL MEDICINE & REHABILITATION

## 2024-12-17 PROCEDURE — 1159F MED LIST DOCD IN RCRD: CPT | Performed by: PHYSICAL MEDICINE & REHABILITATION

## 2024-12-17 RX ORDER — ACETAMINOPHEN AND CODEINE PHOSPHATE 300; 30 MG/1; MG/1
1 TABLET ORAL EVERY 8 HOURS PRN
Qty: 84 TABLET | Refills: 1 | Status: SHIPPED | OUTPATIENT
Start: 2024-12-26

## 2024-12-17 ASSESSMENT — PATIENT HEALTH QUESTIONNAIRE - PHQ9
1. LITTLE INTEREST OR PLEASURE IN DOING THINGS: NOT AT ALL
SUM OF ALL RESPONSES TO PHQ9 QUESTIONS 1 AND 2: 0
2. FEELING DOWN, DEPRESSED OR HOPELESS: NOT AT ALL

## 2024-12-17 ASSESSMENT — ANXIETY QUESTIONNAIRES
3. WORRYING TOO MUCH ABOUT DIFFERENT THINGS: NOT AT ALL
GAD7 TOTAL SCORE: 0
7. FEELING AFRAID AS IF SOMETHING AWFUL MIGHT HAPPEN: NOT AT ALL
4. TROUBLE RELAXING: NOT AT ALL
IF YOU CHECKED OFF ANY PROBLEMS ON THIS QUESTIONNAIRE, HOW DIFFICULT HAVE THESE PROBLEMS MADE IT FOR YOU TO DO YOUR WORK, TAKE CARE OF THINGS AT HOME, OR GET ALONG WITH OTHER PEOPLE: NOT DIFFICULT AT ALL
1. FEELING NERVOUS, ANXIOUS, OR ON EDGE: NOT AT ALL
5. BEING SO RESTLESS THAT IT IS HARD TO SIT STILL: NOT AT ALL
6. BECOMING EASILY ANNOYED OR IRRITABLE: NOT AT ALL
2. NOT BEING ABLE TO STOP OR CONTROL WORRYING: NOT AT ALL

## 2024-12-17 ASSESSMENT — PAIN - FUNCTIONAL ASSESSMENT: PAIN_FUNCTIONAL_ASSESSMENT: 0-10

## 2024-12-17 ASSESSMENT — PAIN SCALES - GENERAL: PAINLEVEL_OUTOF10: 6

## 2024-12-17 ASSESSMENT — COLUMBIA-SUICIDE SEVERITY RATING SCALE - C-SSRS
2. HAVE YOU ACTUALLY HAD ANY THOUGHTS OF KILLING YOURSELF?: NO
6. HAVE YOU EVER DONE ANYTHING, STARTED TO DO ANYTHING, OR PREPARED TO DO ANYTHING TO END YOUR LIFE?: NO

## 2024-12-17 ASSESSMENT — PAIN DESCRIPTION - DESCRIPTORS: DESCRIPTORS: ACHING;DISCOMFORT

## 2024-12-17 ASSESSMENT — ENCOUNTER SYMPTOMS
OCCASIONAL FEELINGS OF UNSTEADINESS: 0
DEPRESSION: 0
LOSS OF SENSATION IN FEET: 0

## 2024-12-17 NOTE — PROGRESS NOTES
Chief complaint  Lower back pain and arthritis in the knees    History  Amira Alex is back for pain management office visit  She has the pain in the back and lower limb but the pain in the back is the worst.  The pain is mechanical worse with bending as well as lifting.  While it radiates to the right knee that is on the lateral and posterior aspect of the leg reaching the knee and lateral leg.  It rarely reach the dorsum of the foot.  As far as the knee is concerned the pain is on the medial and lateral aspect it does wake her up at night on intermittent basis    Pain level without medication is 8/10 , with the medication pain level between 2 and 3/10.     The pain meds are helping control the pain and improving Activities of Daily living and quality of life and quality of sleep.    opioids treatment agreement January 2024  Pill count today, using count tray, and in front of patient :  44    pills , last fill was on 11/26  for 90 tabs,  the count is correct she has 5 days supplies extra. Will decrease the meds to 84 and will   Oarrs pulled and reviewed, no concerns  last urine toxicology testing earlier this year and it was compliant we will repeat  Xray updated spine and knee I have not repeated the image studies from few years ago because there is no indication to repeat the studies at this time  ORT Score is 0  Pain pathology and pain generators knee and spine  Modalities tried injection, surgery, physical therapy, TENS unit, nonsteroidal anti-inflammatory medication injections with epidural and joint injections    Review of Systems :  Denied any fever or chills. No weight loss and no night sweats. No cough or sputum production. No diarrhea   The constipation has been responding to fibers and over the counter medications.     No bladder and bowel incontinence and no other changes in bladder and bowel. No skin changes.  Reports tiredness and fatigability only if the pain is not controlled.     Denied  "opioids diversion and abuse and denies alcoholism. Denies overuse of the pain medications.  No reported euphoria sensation or getting a \"high\" on the pain medications.    The control of the pain with the pain medications is helping the control of the symptoms and allowing the function and activities of daily living, enjoyment of life, improving the quality of life and sleep with less interruption by the pain. The goal is symptomatic control of the nonmalignant chronic pain and not to repair the permanent damage in the tissues inducing the chronic pain conditions. We are aiming to shift the focus from the nonmalignant chronic pain to other aspects of life by symptomatically treating this chronic pain. If this pain is not treated it will lead to major morbidity and it is also associated with increased risks of mortality. The patient understands those very clearly and also understand high risks of morbidity and mortality if not strictly adherent to the treatment recommendations and reporting any associated side effects. Also patient understand the full responsibility associated with these medications to avoid abuse or overuse or any use of these medications for anything besides treating the patient's own chronic pain and nothing else under any circumstances.        Physical examination  Awake, alert and oriented for time place and persons   My nurse  Hector CHILDERS LPN   was present during the entire history and physical examination    Examination of the right knee showed mild clinical effusion. Normal skin color and texture palpation of the knee showed tenderness over the medial and lateral joint line and the sensation of crepitation upon range of motion.  Range of motion from -2 degrees to 105 degrees. No medial lateral instability. No drawer sign.  Lachman is negative.  Positive Roxy both medially and laterally.  Negative pivot shift.  Homans' sign is negative.  Calves are soft.  Knee flexion and extension is 4/5 and " limited by the pain.     Diagnosis  Problem List Items Addressed This Visit       Lumbar spinal stenosis    Relevant Medications    acetaminophen-codeine (Tylenol w/ Codeine #3) 300-30 mg tablet (Start on 12/26/2024)    Osteoarthritis of knee    Relevant Medications    acetaminophen-codeine (Tylenol w/ Codeine #3) 300-30 mg tablet (Start on 12/26/2024)    Long term current use of opiate analgesic    Relevant Medications    acetaminophen-codeine (Tylenol w/ Codeine #3) 300-30 mg tablet (Start on 12/26/2024)        Plan  Reviewed the pain generators.  Went over the types of pain with neuropathic and nociceptive and different pathologies and therapeutic modalities. Discussed the mechanism of action of interventions from acupuncture, physical therapy , regular exercises, injections, botox, spinal cord stimulation, and role of surgery     Went over pathology of the intervertebral disc displacement and the anatomical relation to the Nerve roots and relation to the radicular symptoms. Went over treatment modalities with conservative treatment including acupuncture   and epidural steroid injection with fluoroscopy guidance and last resort of surgery    Based on the above findings and the clinical response to the opioids medications and improvement of the activities of daily living, sleep, and work performance. We made this complex decision to continue the opioids therapy in light of the evidence of the patient's responsibility in using the pain medications as prescribed for the nonmalignant chronic pain condition. We discussed about the use of the pain medications to treat the symptoms of chronic nonmalignant pain and we are not trying the repair the permanent damage in the tissues, rather we are trying to control the symptoms induced by the permanent damage to the tissues inducing the chronic pain condition and resulting disability. I explained the difference and discussed it with the patient and stressed the importance of  knowing the difference especially because of the potential side effects and the potential addicting effect and habit forming nature of the dangerous drugs we are using to treat the symptoms of the chronic pain.      We discussed that we are prescribing the medications on good luigi and legitimate medical reason.     We reviewed the side effects and precautions of opioids prescriptions as discussed in the opioids treatment agreement.    realizes the interaction between the therapeutic classes including the respiratory depression and potential death     Random drug testing   we will submit     As we discussed with her we will decrease the Tylenol with codeine to 3 times a day she wanted to keep 3 a day for the next 2 months until January and February are gone and be able to tolerate the cold weather    Discussed about NSAIDS and I explained about the opioids sparing effect to allow keeping the opioids dose at minimal effective dose.   I went over the potential side effects of the NSAIDS on the gastrointestinal, renal and cardiovascular systems.      I detailed the side effects from the acetaminophen in the medication and made aware of those. I also explained about the cumulative effects on the organs and mainly the liver.     Given the opioids therapy , we discussed about the risk for accidental over dose on the pain medications, either for patient or other household. I went over the mechanism of action and mode of use of the Naloxone according to the  recommendations. I will provide a prescription for a kit.     Follow-up 8 weeks or earlier if needed     The level of clinical decision making in this office visit,  is high, given the high risks of complications with the morbidity and mortality due to the fact that acute and chronic pain may pose a threat to life and bodily function, if under treated, poorly treated, or with failure to maintain adequate treatment and timely medical follow up. Additionally  over treatment has its own set of complications including overdosing on the pain medications and also the habit forming potentials with the use of the medications used to treat chronic painful conditions including therapeutic classes classified as dangerous medications. Given the serious and fluctuating nature of pain level and instensity with extensive consideration for whenever pain changes, there is always the risk of prolonged functional impairment requiring close patient monitoring with regular assessments and reassessments and high level medical decision making at every office visit. The amount and complexity of data reviewed is high given the patient clinical presentation, labs,  data, radiology reports, and other tests as discussed during office visits. Pertinent data whether positive or negative were taken in consideration in the process of making this high level medical decision.

## 2024-12-20 LAB
1OH-MIDAZOLAM UR CFM-MCNC: <25 NG/ML
6MAM UR CFM-MCNC: <25 NG/ML
7AMINOCLONAZEPAM UR CFM-MCNC: <25 NG/ML
A-OH ALPRAZ UR CFM-MCNC: <25 NG/ML
ALPRAZ UR CFM-MCNC: <25 NG/ML
CHLORDIAZEP UR CFM-MCNC: <25 NG/ML
CLONAZEPAM UR CFM-MCNC: <25 NG/ML
CODEINE UR CFM-MCNC: >2500 NG/ML
DIAZEPAM UR CFM-MCNC: <25 NG/ML
EDDP UR CFM-MCNC: <25 NG/ML
FENTANYL UR CFM-MCNC: <2.5 NG/ML
HYDROCODONE CTO UR CFM-MCNC: 183 NG/ML
HYDROMORPHONE UR CFM-MCNC: 145 NG/ML
LORAZEPAM UR CFM-MCNC: <25 NG/ML
METHADONE UR CFM-MCNC: <25 NG/ML
MIDAZOLAM UR CFM-MCNC: <25 NG/ML
MORPHINE UR CFM-MCNC: >2500 NG/ML
NORDIAZEPAM UR CFM-MCNC: <25 NG/ML
NORFENTANYL UR CFM-MCNC: <2.5 NG/ML
NORHYDROCODONE UR CFM-MCNC: 272 NG/ML
NOROXYCODONE UR CFM-MCNC: <25 NG/ML
NORTRAMADOL UR-MCNC: <50 NG/ML
OXAZEPAM UR CFM-MCNC: <25 NG/ML
OXYCODONE UR CFM-MCNC: <25 NG/ML
OXYMORPHONE UR CFM-MCNC: <25 NG/ML
TEMAZEPAM UR CFM-MCNC: <25 NG/ML
TRAMADOL UR CFM-MCNC: <50 NG/ML
ZOLPIDEM UR CFM-MCNC: <25 NG/ML
ZOLPIDEM UR-MCNC: <25 NG/ML

## 2025-01-16 ENCOUNTER — APPOINTMENT (OUTPATIENT)
Dept: PRIMARY CARE | Facility: CLINIC | Age: 80
End: 2025-01-16
Payer: COMMERCIAL

## 2025-01-16 VITALS — HEART RATE: 76 BPM | SYSTOLIC BLOOD PRESSURE: 108 MMHG | DIASTOLIC BLOOD PRESSURE: 74 MMHG

## 2025-01-16 DIAGNOSIS — E03.9 ACQUIRED HYPOTHYROIDISM: ICD-10-CM

## 2025-01-16 DIAGNOSIS — E78.00 HYPERCHOLESTEROLEMIA: ICD-10-CM

## 2025-01-16 DIAGNOSIS — I50.9 CONGESTIVE HEART FAILURE, UNSPECIFIED HF CHRONICITY, UNSPECIFIED HEART FAILURE TYPE: ICD-10-CM

## 2025-01-16 DIAGNOSIS — M06.9 RHEUMATOID ARTHRITIS, INVOLVING UNSPECIFIED SITE, UNSPECIFIED WHETHER RHEUMATOID FACTOR PRESENT (MULTI): Primary | ICD-10-CM

## 2025-01-16 DIAGNOSIS — K21.9 GASTROESOPHAGEAL REFLUX DISEASE WITHOUT ESOPHAGITIS: ICD-10-CM

## 2025-01-16 PROCEDURE — 99214 OFFICE O/P EST MOD 30 MIN: CPT | Performed by: FAMILY MEDICINE

## 2025-01-16 PROCEDURE — 1160F RVW MEDS BY RX/DR IN RCRD: CPT | Performed by: FAMILY MEDICINE

## 2025-01-16 PROCEDURE — 1159F MED LIST DOCD IN RCRD: CPT | Performed by: FAMILY MEDICINE

## 2025-01-16 PROCEDURE — 1036F TOBACCO NON-USER: CPT | Performed by: FAMILY MEDICINE

## 2025-01-16 RX ORDER — SPIRONOLACTONE 25 MG/1
25 TABLET ORAL 2 TIMES DAILY
Qty: 180 TABLET | Refills: 1 | Status: SHIPPED | OUTPATIENT
Start: 2025-01-16

## 2025-01-16 RX ORDER — FUROSEMIDE 20 MG/1
50 TABLET ORAL DAILY
Qty: 90 TABLET | Refills: 1 | Status: SHIPPED | OUTPATIENT
Start: 2025-01-16

## 2025-01-16 RX ORDER — OMEPRAZOLE 40 MG/1
40 CAPSULE, DELAYED RELEASE ORAL DAILY
Qty: 90 CAPSULE | Refills: 3 | Status: SHIPPED | OUTPATIENT
Start: 2025-01-16

## 2025-01-16 RX ORDER — LISINOPRIL 2.5 MG/1
2.5 TABLET ORAL DAILY
Qty: 90 TABLET | Refills: 1 | Status: SHIPPED | OUTPATIENT
Start: 2025-01-16

## 2025-01-16 RX ORDER — ATORVASTATIN CALCIUM 40 MG/1
40 TABLET, FILM COATED ORAL NIGHTLY
Qty: 90 TABLET | Refills: 1 | Status: SHIPPED | OUTPATIENT
Start: 2025-01-16

## 2025-01-16 RX ORDER — LEVOTHYROXINE SODIUM 88 UG/1
88 TABLET ORAL
Qty: 90 TABLET | Refills: 3 | Status: SHIPPED | OUTPATIENT
Start: 2025-01-16

## 2025-01-16 RX ORDER — CARVEDILOL 6.25 MG/1
6.25 TABLET ORAL 2 TIMES DAILY
Qty: 180 TABLET | Refills: 1 | Status: SHIPPED | OUTPATIENT
Start: 2025-01-16

## 2025-01-16 ASSESSMENT — ENCOUNTER SYMPTOMS
LOSS OF SENSATION IN FEET: 0
DEPRESSION: 0
OCCASIONAL FEELINGS OF UNSTEADINESS: 0

## 2025-01-16 ASSESSMENT — PATIENT HEALTH QUESTIONNAIRE - PHQ9
1. LITTLE INTEREST OR PLEASURE IN DOING THINGS: NOT AT ALL
2. FEELING DOWN, DEPRESSED OR HOPELESS: NOT AT ALL
SUM OF ALL RESPONSES TO PHQ9 QUESTIONS 1 AND 2: 0

## 2025-01-16 NOTE — PROGRESS NOTES
Subjective   Patient ID: Amira Alex is a 79 y.o. female who presents for Follow-up.    HPI   Pt is doing ok  Pt does have some edema when she misses Lasix  Wears compression hose  Elevates her legs  Pt denies gerd sx  Thyroid dose is good  Mood is controlled    Review of Systems   All other systems reviewed and are negative.      Objective   /74   Pulse 76     Physical Exam  Constitutional:       Appearance: Normal appearance.   HENT:      Head: Normocephalic and atraumatic.      Right Ear: Tympanic membrane, ear canal and external ear normal.      Left Ear: Tympanic membrane, ear canal and external ear normal.      Nose: Nose normal.      Mouth/Throat:      Mouth: Mucous membranes are moist.      Pharynx: Oropharynx is clear.   Eyes:      Extraocular Movements: Extraocular movements intact.      Conjunctiva/sclera: Conjunctivae normal.      Pupils: Pupils are equal, round, and reactive to light.   Cardiovascular:      Rate and Rhythm: Normal rate and regular rhythm.      Pulses: Normal pulses.      Heart sounds: Normal heart sounds.   Pulmonary:      Effort: Pulmonary effort is normal.      Breath sounds: Normal breath sounds.   Abdominal:      General: Abdomen is flat. Bowel sounds are normal.      Palpations: Abdomen is soft.   Musculoskeletal:         General: Normal range of motion.      Cervical back: Normal range of motion and neck supple.   Skin:     General: Skin is warm and dry.      Capillary Refill: Capillary refill takes less than 2 seconds.   Neurological:      General: No focal deficit present.      Mental Status: She is alert and oriented to person, place, and time.   Psychiatric:         Mood and Affect: Mood normal.         Behavior: Behavior normal.         Thought Content: Thought content normal.         Assessment/Plan   Diagnoses and all orders for this visit:  Rheumatoid arthritis, involving unspecified site, unspecified whether rheumatoid factor present (Multi)  -     Disability  Fox  Congestive heart failure, unspecified HF chronicity, unspecified heart failure type  -     lisinopril 2.5 mg tablet; Take 1 tablet (2.5 mg) by mouth once daily.  -     spironolactone (Aldactone) 25 mg tablet; Take 1 tablet (25 mg) by mouth 2 times a day.  -     furosemide (Lasix) 20 mg tablet; Take 2.5 tablets (50 mg) by mouth once daily.  -     carvedilol (Coreg) 6.25 mg tablet; Take 1 tablet (6.25 mg) by mouth 2 times a day.  Gastroesophageal reflux disease without esophagitis  -     omeprazole (PriLOSEC) 40 mg DR capsule; Take 1 capsule (40 mg) by mouth once daily.  Hypercholesterolemia  -     atorvastatin (Lipitor) 40 mg tablet; Take 1 tablet (40 mg) by mouth once daily at bedtime.  Acquired hypothyroidism  -     levothyroxine (Synthroid, Levoxyl) 88 mcg tablet; Take 1 tablet (88 mcg) by mouth once daily in the morning. Take before meals.

## 2025-01-22 ENCOUNTER — APPOINTMENT (OUTPATIENT)
Dept: PRIMARY CARE | Facility: CLINIC | Age: 80
End: 2025-01-22
Payer: COMMERCIAL

## 2025-02-18 ENCOUNTER — APPOINTMENT (OUTPATIENT)
Dept: PAIN MEDICINE | Facility: CLINIC | Age: 80
End: 2025-02-18
Payer: COMMERCIAL

## 2025-02-18 VITALS
WEIGHT: 146 LBS | OXYGEN SATURATION: 98 % | DIASTOLIC BLOOD PRESSURE: 79 MMHG | HEIGHT: 62 IN | BODY MASS INDEX: 26.87 KG/M2 | SYSTOLIC BLOOD PRESSURE: 145 MMHG | HEART RATE: 76 BPM

## 2025-02-18 DIAGNOSIS — M17.0 PRIMARY OSTEOARTHRITIS OF BOTH KNEES: ICD-10-CM

## 2025-02-18 DIAGNOSIS — Z79.891 LONG TERM CURRENT USE OF OPIATE ANALGESIC: ICD-10-CM

## 2025-02-18 DIAGNOSIS — M48.062 SPINAL STENOSIS OF LUMBAR REGION WITH NEUROGENIC CLAUDICATION: ICD-10-CM

## 2025-02-18 PROCEDURE — 1159F MED LIST DOCD IN RCRD: CPT | Performed by: PHYSICAL MEDICINE & REHABILITATION

## 2025-02-18 PROCEDURE — 99214 OFFICE O/P EST MOD 30 MIN: CPT | Performed by: PHYSICAL MEDICINE & REHABILITATION

## 2025-02-18 PROCEDURE — 1036F TOBACCO NON-USER: CPT | Performed by: PHYSICAL MEDICINE & REHABILITATION

## 2025-02-18 PROCEDURE — G2211 COMPLEX E/M VISIT ADD ON: HCPCS | Performed by: PHYSICAL MEDICINE & REHABILITATION

## 2025-02-18 PROCEDURE — 1160F RVW MEDS BY RX/DR IN RCRD: CPT | Performed by: PHYSICAL MEDICINE & REHABILITATION

## 2025-02-18 RX ORDER — ACETAMINOPHEN AND CODEINE PHOSPHATE 300; 30 MG/1; MG/1
1 TABLET ORAL EVERY 8 HOURS PRN
Qty: 80 TABLET | Refills: 1 | Status: SHIPPED | OUTPATIENT
Start: 2025-02-23

## 2025-02-18 NOTE — PROGRESS NOTES
Chief complaint  Shoulder and knees pain    My nurse  Hector CHILDERS LPN,   was present during the entire history and physical examination    History  Amira Alex is back for pain management office visit  She continues to have the pain in the joint.  She is taking the pain medication as prescribed sometimes she tried to take 2 a day and they certainly agreed with her and encouraged her to continue cutting back on the pain medication she mentions that with the colder weather is more difficult in warmer weather yet she is still able to function and do her activities of daily living  At this time she is limited with the pain.  The pain is deep achy stabbing mechanical in characteristics it does wake her up at night on intermittent basis  .  There is no radiation of the pain to the upper or to the lower limb.    Again today long discussion about putting effort in cutting back on pain medications. Discussed about pain level changing and we do not know if the medications at this amount are still needed until we try and cut back slowly on pain medications. If the cut is tolerated then we continue. If cut not tolerated then, will go back on the pain medications level.  The goal from this is to keep the pain medications at the lowest effective dose.     Pain level without medication is 8/10 , with the medication pain level between 2 and 3/10.     Pain disability index improvement by 3 to 4  points, across different functional categories, with the pain control with the meds. Forms filled by patient are scanned in the chart    The pain meds are helping control the pain and improving Activities of Daily living and quality of life and quality of sleep.    opioids treatment agreement today went over the agreement and sent her additional copy to sign    Pill count today, using count tray, and in front of patient :  26    pills , last fill was on 1/26  for 84 tabs,  the count is correct  Oarrs pulled and reviewed, no concerns  last  "urine toxicology testing was compliant this was done on : December 2024 we will repeat  Xray updated spine  ORT Score is 0  Pain pathology and pain generators spine  Modalities tried injection, surgery, physical therapy, TENS unit, nonsteroidal anti-inflammatory medication       Review of Systems :  Denied any fever or chills. No weight loss and no night sweats. No cough or sputum production. No diarrhea   The constipation has been responding to fibers and over the counter medications.     No bladder and bowel incontinence and no other changes in bladder and bowel. No skin changes.  Reports tiredness and fatigability only if the pain is not controlled.     Denied opioids diversion and abuse and denies alcoholism. Denies overuse of the pain medications.  No reported euphoria sensation or getting a \"high\" on the pain medications.    The control of the pain with the pain medications is helping the control of the symptoms and allowing the function and activities of daily living, enjoyment of life, improving the quality of life and sleep with less interruption by the pain. The goal is symptomatic control of the nonmalignant chronic pain and not to repair the permanent damage in the tissues inducing the chronic pain conditions. We are aiming to shift the focus from the nonmalignant chronic pain to other aspects of life by symptomatically treating this chronic pain. If this pain is not treated it will lead to major morbidity and it is also associated with increased risks of mortality. The patient understands those very clearly and also understand high risks of morbidity and mortality if not strictly adherent to the treatment recommendations and reporting any associated side effects. Also patient understand the full responsibility associated with these medications to avoid abuse or overuse or any use of these medications for anything besides treating the patient's own chronic pain and nothing else under any circumstances.  "       Physical examination  Awake, alert and oriented for time place and persons     She has impingement in the shoulders on both sides 110 degrees no cervical root tension sign.  No shoulder subluxation.  Knees showed pain over the medial joint line no instability.    Diagnosis  Problem List Items Addressed This Visit       Lumbar spinal stenosis    Relevant Medications    acetaminophen-codeine (Tylenol w/ Codeine #3) 300-30 mg tablet (Start on 2/23/2025)    Osteoarthritis of knee    Relevant Medications    acetaminophen-codeine (Tylenol w/ Codeine #3) 300-30 mg tablet (Start on 2/23/2025)    Long term current use of opiate analgesic    Relevant Medications    acetaminophen-codeine (Tylenol w/ Codeine #3) 300-30 mg tablet (Start on 2/23/2025)        Plan  Reviewed the pain generators.  Went over the types of pain with neuropathic and nociceptive and different pathologies and therapeutic modalities. Discussed the mechanism of action of interventions from acupuncture, physical therapy , regular exercises, injections, botox, spinal cord stimulation, and role of surgery     Went over pathology of the intervertebral disc displacement and the anatomical relation to the Nerve roots and relation to the radicular symptoms. Went over treatment modalities with conservative treatment including acupuncture   and epidural steroid injection with fluoroscopy guidance and last resort of surgery    Based on the above findings and the clinical response to the opioids medications and improvement of the activities of daily living, sleep, and work performance. We made this complex decision to continue the opioids therapy in light of the evidence of the patient's responsibility in using the pain medications as prescribed for the nonmalignant chronic pain condition. We discussed about the use of the pain medications to treat the symptoms of chronic nonmalignant pain and we are not trying the repair the permanent damage in the tissues,  rather we are trying to control the symptoms induced by the permanent damage to the tissues inducing the chronic pain condition and resulting disability. I explained the difference and discussed it with the patient and stressed the importance of knowing the difference especially because of the potential side effects and the potential addicting effect and habit forming nature of the dangerous drugs we are using to treat the symptoms of the chronic pain.      We discussed that we are prescribing the medications on good luigi and legitimate medical reason.     We reviewed the side effects and precautions of opioids prescriptions as discussed in the opioids treatment agreement.    realizes the interaction between the therapeutic classes including the respiratory depression and potential death     Random drug testing   we will submit     At this time we will continue with Tylenol with codeine but we will To 80 tablets for the month also continue with home exercise program    Discussed about NSAIDS and I explained about the opioids sparing effect to allow keeping the opioids dose at minimal effective dose.   I went over the potential side effects of the NSAIDS on the gastrointestinal, renal and cardiovascular systems.      I detailed the side effects from the acetaminophen in the medication and made aware of those. I also explained about the cumulative effects on the organs and mainly the liver.     Given the opioids therapy , we discussed about the risk for accidental over dose on the pain medications, either for patient or other household. I went over the mechanism of action and mode of use of the Naloxone according to the  recommendations. I will provide a prescription for a kit.     Follow-up 8 weeks or earlier if needed     The level of clinical decision making in this office visit,  is high, given the high risks of complications with the morbidity and mortality due to the fact that acute and chronic pain  may pose a threat to life and bodily function, if under treated, poorly treated, or with failure to maintain adequate treatment and timely medical follow up. Additionally over treatment has its own set of complications including overdosing on the pain medications and also the habit forming potentials with the use of the medications used to treat chronic painful conditions including therapeutic classes classified as dangerous medications. Given the serious and fluctuating nature of pain level and instensity with extensive consideration for whenever pain changes, there is always the risk of prolonged functional impairment requiring close patient monitoring with regular assessments and reassessments and high level medical decision making at every office visit. The amount and complexity of data reviewed is high given the patient clinical presentation, labs,  data, radiology reports, and other tests as discussed during office visits. Pertinent data whether positive or negative were taken in consideration in the process of making this high level medical decision.

## 2025-04-17 ENCOUNTER — APPOINTMENT (OUTPATIENT)
Dept: PAIN MEDICINE | Facility: CLINIC | Age: 80
End: 2025-04-17
Payer: MEDICARE

## 2025-04-17 DIAGNOSIS — M48.062 SPINAL STENOSIS OF LUMBAR REGION WITH NEUROGENIC CLAUDICATION: ICD-10-CM

## 2025-04-17 DIAGNOSIS — Z79.891 LONG TERM CURRENT USE OF OPIATE ANALGESIC: ICD-10-CM

## 2025-04-17 DIAGNOSIS — M17.0 PRIMARY OSTEOARTHRITIS OF BOTH KNEES: ICD-10-CM

## 2025-04-17 PROCEDURE — 99214 OFFICE O/P EST MOD 30 MIN: CPT | Performed by: PHYSICAL MEDICINE & REHABILITATION

## 2025-04-17 PROCEDURE — 1160F RVW MEDS BY RX/DR IN RCRD: CPT | Performed by: PHYSICAL MEDICINE & REHABILITATION

## 2025-04-17 PROCEDURE — 1036F TOBACCO NON-USER: CPT | Performed by: PHYSICAL MEDICINE & REHABILITATION

## 2025-04-17 PROCEDURE — 1159F MED LIST DOCD IN RCRD: CPT | Performed by: PHYSICAL MEDICINE & REHABILITATION

## 2025-04-17 PROCEDURE — G2211 COMPLEX E/M VISIT ADD ON: HCPCS | Performed by: PHYSICAL MEDICINE & REHABILITATION

## 2025-04-17 RX ORDER — ACETAMINOPHEN AND CODEINE PHOSPHATE 300; 30 MG/1; MG/1
1 TABLET ORAL EVERY 8 HOURS PRN
Qty: 80 TABLET | Refills: 1 | Status: SHIPPED | OUTPATIENT
Start: 2025-04-26 | End: 2025-05-26

## 2025-04-17 ASSESSMENT — ENCOUNTER SYMPTOMS
LOSS OF SENSATION IN FEET: 0
DEPRESSION: 0
OCCASIONAL FEELINGS OF UNSTEADINESS: 0

## 2025-04-17 NOTE — PROGRESS NOTES
Chief complaint  Back and lower limbgs pain   Knees pain      Hector CHILDERS TERRANCE,   was present during the entire history and physical examination    History  Amira Alex is back for pain management office visit  Getting cataract surgery. That is not painful and no need for additional pain meds  We are treating pain int eh back and knees  Pain meds are helping without pain meds The pain is interfering with activities of daily living, quality of life and quality of sleep. It is limiting the functions and everything takes longer to complete because of the slowing related to the pain. Movements are cautious to avoid aggravation of the symptoms.  Pain mostly mechanical worse with standing and weight bearing    Detailed discussion and explanation about the need to try  and cut back on pain medications.  Entertained question about   pain level changing from acute, subacute to chronic pain.  Currently the pain and chronic.  The higher amount of medication were for the acute and subacute phase.  Therefore   we do not know exactly if the medications at this amount are still needed until we try and cut back slowly on pain medications. If the cut is tolerated then we continue trying to cut back further. If cut not tolerated then, will try additional none chemical methods like injection or physical therapy or we can go back on the pain medications level.  The goal from this is to keep the pain medications at the lowest effective dose and to control the tolerance that develops from the chronic use of opioid therapy.  Our goal is to keep the pain controlled with minimal effective dose.  That will help cutting back on the potential for side effects, and also will help decrease the amount of tolerance developing from the chronic use of opioid medication.    In addition to the above, we discussed about emerging data about tapering opioid therapy for chronic nonmalignant pain.  These are showing increasing evidence of improving the  chronic pain itself, anxiety and depression, with the tapering of opioid therapy for chronic nonmalignant pain.  These are additional benefits to the above that we have been discussing.  As long as the cut back is done progressively and safely which we are doing.     Pain level without medication is 8/10 , with the medication pain level 2 to 3 /10.     Pain disability index (PDI) improvement by 3 to 4 points, across different functional categories, with the pain control with the meds. Forms filled by patient are scanned in the chart    The pain meds are helping control the pain and improving Activities of Daily living and quality of life and quality of sleep.    opioids treatment agreement Jan 2025    Pill count today, using count tray, and in front of patient, Nurse and myself :  18    pills , last fill was on 3/25  for 80 tabs, she is filling on the 26th of the month.  the meds pill count today is correct  Oarrs pulled and reviewed, no concerns  last urine toxicology testing was compliant this was done on : Dec 2024  Xray updated Spine   ORT (opioid risk tool) score is  0  Pain pathology and pain generators spine   Modalities tried injection, surgery, physical therapy, TENS unit, nonsteroidal anti-inflammatory medication       Review of Systems :  Denied any fever or chills. No weight loss and no night sweats. No cough or sputum production. No diarrhea   The constipation has been responding to fibers and over the counter medications.     No bladder and bowel incontinence and no other changes in bladder and bowel. No skin changes.  Reports tiredness and fatigability only if the pain is not controlled.     I further Asked about symptoms or changes affecting the vision, hearing, breathing, digestive system, urinary symptoms, skin, other musculoskeletal condition, neurological conditions these are negative except as detailed in the history and physical examination above    Denied opioids diversion and abuse and denies  "alcoholism. Denies overuse of the pain medications.  No reported euphoria sensation or getting a \"high\" on the pain medications.    The control of the pain with the pain medications is helping the control of the symptoms and allowing the function and activities of daily living, enjoyment of life, improving the quality of life and sleep with less interruption by the pain. The goal is symptomatic control of the nonmalignant chronic pain and not to repair the permanent damage in the tissues inducing the chronic pain conditions. We are aiming to shift the focus from the nonmalignant chronic pain to other aspects of life by symptomatically treating this chronic pain. If this pain is not treated it will lead to major morbidity and it is also associated with increased risks of mortality. The patient understands those very clearly and also understand high risks of morbidity and mortality if not strictly adherent to the treatment recommendations and reporting any associated side effects. Also patient understand the full responsibility associated with these medications to avoid abuse or overuse or any use of these medications for anything besides treating the patient's own chronic pain and nothing else under any circumstances.        Physical examination  Awake, alert and oriented for time place and persons   Pupils are equal and reactive to light and accommodation    Examination of the left knee showed mild clinical effusion. Normal skin color and texture palpation of the knee showed tenderness over the medial and lateral joint line and the sensation of crepitation upon range of motion.  Range of motion from -2 degrees to 105 degrees. No medial lateral instability. No drawer sign.  Lachman is negative.  Positive Roxy both medially and laterally.  Negative pivot shift.  Homans' sign is negative.  Calves are soft.  Knee flexion and extension is 4/5 and limited by the pain.     Diagnosis  Problem List Items Addressed This " Visit       Lumbar spinal stenosis    Relevant Medications    acetaminophen-codeine (Tylenol w/ Codeine #3) 300-30 mg tablet (Start on 4/26/2025)    Osteoarthritis of knee    Relevant Medications    acetaminophen-codeine (Tylenol w/ Codeine #3) 300-30 mg tablet (Start on 4/26/2025)    Long term current use of opiate analgesic    Relevant Medications    acetaminophen-codeine (Tylenol w/ Codeine #3) 300-30 mg tablet (Start on 4/26/2025)        Plan  Reviewed the pain generators.  Went over the types of pain with neuropathic and nociceptive and different pathologies and therapeutic modalities. Discussed the mechanism of action of interventions from acupuncture, physical therapy , regular exercises, injections, botox, spinal cord stimulation, and role of surgery     Went over pathology of the intervertebral disc displacement and the anatomical relation to the Nerve roots and relation to the radicular symptoms. Went over treatment modalities with conservative treatment including acupuncture   and epidural steroid injection with fluoroscopy guidance and last resort of surgery    Based on the above findings and the clinical response to the opioids medications and improvement of the activities of daily living, sleep, and work performance. We made this complex decision to continue the opioids therapy in light of the evidence of the patient's responsibility in using the pain medications as prescribed for the nonmalignant chronic pain condition. We discussed about the use of the pain medications to treat the symptoms of chronic nonmalignant pain and we are not trying the repair the permanent damage in the tissues, rather we are trying to control the symptoms induced by the permanent damage to the tissues inducing the chronic pain condition and resulting disability. I explained the difference and discussed it with the patient and stressed the importance of knowing the difference especially because of the potential side effects  and the potential addicting effect and habit forming nature of the dangerous drugs we are using to treat the symptoms of the chronic pain.      We discussed that we are prescribing the medications on good luigi and legitimate medical reason within the scope of professional medical practice.     We reviewed the side effects and precautions of opioids prescriptions as discussed in the opioids treatment agreement.    realizes the interaction between the therapeutic classes including the respiratory depression and potential death     Random drug testing   we will submit     Continue [facility] meds. She preferrs fill on the 26th  of each month. Will keep rx for 80 tabs for 30 days       Discussed about NSAIDS and I explained about the opioids sparing effect to allow keeping the opioids dose at minimal effective dose.   I went over the potential side effects of the NSAIDS on the gastrointestinal, renal and cardiovascular systems.      I detailed the side effects from the acetaminophen in the medication and made aware of those. I also explained about the cumulative effects on the organs and mainly the liver.     Given the opioids therapy , we discussed about the risk for accidental over dose on the pain medications, either for patient or other household. I went over the mechanism of action and mode of use of the Naloxone according to the  recommendations. I will provide a prescription for a kit.     Follow-up 8 weeks or earlier if needed     The level of clinical decision making in this office visit,  is high, given the high risks of complications with the morbidity and mortality due to the fact that acute and chronic pain may pose a threat to life and bodily function, if under treated, poorly treated, or with failure to maintain adequate treatment and timely medical follow up. Additionally over treatment has its own set of complications including overdosing on the pain medications and also the habit forming  potentials with the use of the medications used to treat chronic painful conditions including therapeutic classes classified as dangerous medications. Given the serious and fluctuating nature of pain level and instensity with extensive consideration for whenever pain changes, there is always the risk of prolonged functional impairment requiring close patient monitoring with regular assessments and reassessments and high level medical decision making at every office visit. The amount and complexity of data reviewed is high given the patient clinical presentation, labs,  data, radiology reports, and other tests as discussed during office visits. Pertinent data whether positive or negative were taken in consideration in the process of making this high level medical decision.

## 2025-06-23 ENCOUNTER — APPOINTMENT (OUTPATIENT)
Dept: PAIN MEDICINE | Facility: CLINIC | Age: 80
End: 2025-06-23
Payer: MEDICARE

## 2025-06-23 DIAGNOSIS — M48.062 SPINAL STENOSIS OF LUMBAR REGION WITH NEUROGENIC CLAUDICATION: Primary | ICD-10-CM

## 2025-06-23 DIAGNOSIS — I50.23 HEART FAILURE, SYSTOLIC, ACUTE ON CHRONIC: ICD-10-CM

## 2025-06-23 DIAGNOSIS — M17.12 PRIMARY OSTEOARTHRITIS OF LEFT KNEE: ICD-10-CM

## 2025-06-23 DIAGNOSIS — I48.0 PAROXYSMAL ATRIAL FIBRILLATION (MULTI): ICD-10-CM

## 2025-06-23 DIAGNOSIS — Z79.891 LONG TERM CURRENT USE OF OPIATE ANALGESIC: ICD-10-CM

## 2025-06-23 DIAGNOSIS — E11.29 CONTROLLED TYPE 2 DIABETES MELLITUS WITH OTHER DIABETIC KIDNEY COMPLICATION, UNSPECIFIED WHETHER LONG TERM INSULIN USE: ICD-10-CM

## 2025-06-23 DIAGNOSIS — N18.32 CHRONIC KIDNEY DISEASE (CKD) STAGE G3B/A1, MODERATELY DECREASED GLOMERULAR FILTRATION RATE (GFR) BETWEEN 30-44 ML/MIN/1.73 SQUARE METER AND ALBUMINURIA CREATININE RATIO LESS THAN 30 MG/G (CMS* (MULTI): ICD-10-CM

## 2025-06-23 DIAGNOSIS — M06.9 RHEUMATOID ARTHRITIS OF OTHER SITE, UNSPECIFIED WHETHER RHEUMATOID FACTOR PRESENT (MULTI): ICD-10-CM

## 2025-06-23 DIAGNOSIS — M35.3 POLYMYALGIA RHEUMATICA (MULTI): ICD-10-CM

## 2025-06-23 DIAGNOSIS — J44.9 CHRONIC OBSTRUCTIVE PULMONARY DISEASE, UNSPECIFIED COPD TYPE (MULTI): ICD-10-CM

## 2025-06-23 DIAGNOSIS — M17.0 PRIMARY OSTEOARTHRITIS OF BOTH KNEES: ICD-10-CM

## 2025-06-23 PROCEDURE — 1159F MED LIST DOCD IN RCRD: CPT | Performed by: PHYSICAL MEDICINE & REHABILITATION

## 2025-06-23 PROCEDURE — 99214 OFFICE O/P EST MOD 30 MIN: CPT | Performed by: PHYSICAL MEDICINE & REHABILITATION

## 2025-06-23 PROCEDURE — 1160F RVW MEDS BY RX/DR IN RCRD: CPT | Performed by: PHYSICAL MEDICINE & REHABILITATION

## 2025-06-23 PROCEDURE — G2211 COMPLEX E/M VISIT ADD ON: HCPCS | Performed by: PHYSICAL MEDICINE & REHABILITATION

## 2025-06-23 PROCEDURE — 1036F TOBACCO NON-USER: CPT | Performed by: PHYSICAL MEDICINE & REHABILITATION

## 2025-06-23 RX ORDER — ACETAMINOPHEN AND CODEINE PHOSPHATE 300; 30 MG/1; MG/1
1 TABLET ORAL EVERY 8 HOURS PRN
Qty: 80 TABLET | Refills: 1 | Status: SHIPPED | OUTPATIENT
Start: 2025-06-23 | End: 2025-07-21

## 2025-06-23 ASSESSMENT — ENCOUNTER SYMPTOMS
LOSS OF SENSATION IN FEET: 0
OCCASIONAL FEELINGS OF UNSTEADINESS: 0
DEPRESSION: 0

## 2025-06-23 NOTE — PROGRESS NOTES
Chief complaint   Pain in the lower back with addition to the lower limb but intermittent basis pain in multiple joints     Radhacarole ALVARADO CARLOS,   was present during the entire history and physical examination    History  Amira Alex is back for pain management office visit  She continues to have the pain in the back with radiation to the lower limbs the pain is deep aching stabbing worse with movement.  She also joints pain associated with morning stiffness but only for 5 to 10 minutes there is no stiffness longer than that..  The send the pain medication are only helping with the pain control we have been trying to cut back on those.  Her symptoms are from osteoarthritis she does not want to get injection.  I discussed with her about cutting back on the pain medication with the chronic pain anxiety as well as the depression.    Will continue with home exercise program she had physical therapy in the past.      Pain level without medication is 8/10 , with the medication pain level between 2 and 3/10.     Pain disability index (PDI) improvement   across different functional categories, with the pain control with the meds. Forms filled by patient are scanned in the chart    The pain meds are helping control the pain and improving Activities of Daily living and quality of life and quality of sleep.    opioids treatment agreement Jan 2025    Pill count today, using count tray, and in front of patient, Nurse and myself :  13    pills , last fill was on 5/27  for 80 tabs,  the meds pill count today is correct  Oarrs pulled and reviewed, no concerns  last urine toxicology testing was compliant this was done on : 12/2024  Xray updated spine  ORT (opioid risk tool) score is 0  Pain pathology and pain generators spine and multiple joints   Modalities tried injection, surgery, physical therapy, TENS unit, nonsteroidal anti-inflammatory medication       Review of Systems :  Denied any fever or chills. No weight loss and no  "night sweats. No cough or sputum production. No diarrhea   The constipation has been responding to fibers and over the counter medications.     No bladder and bowel incontinence and no other changes in bladder and bowel. No skin changes.  Reports tiredness and fatigability only if the pain is not controlled.     I further Asked about symptoms or changes affecting the vision, hearing, breathing, digestive system, urinary symptoms, skin, other musculoskeletal condition, neurological conditions these are negative except as detailed in the history and physical examination above    Denied opioids diversion and abuse and denies alcoholism. Denies overuse of the pain medications.  No reported euphoria sensation or getting a \"high\" on the pain medications.    The control of the pain with the pain medications is helping the control of the symptoms and allowing the function and activities of daily living, enjoyment of life, improving the quality of life and sleep with less interruption by the pain. The goal is symptomatic control of the nonmalignant chronic pain and not to repair the permanent damage in the tissues inducing the chronic pain conditions. We are aiming to shift the focus from the nonmalignant chronic pain to other aspects of life by symptomatically treating this chronic pain. If this pain is not treated it will lead to major morbidity and it is also associated with increased risks of mortality. The patient understands those very clearly and also understand high risks of morbidity and mortality if not strictly adherent to the treatment recommendations and reporting any associated side effects. Also patient understand the full responsibility associated with these medications to avoid abuse or overuse or any use of these medications for anything besides treating the patient's own chronic pain and nothing else under any circumstances.        Physical examination  Awake, alert and oriented for time place and persons "   Pupils are equal and reactive to light and accommodation    Range of motion of the lumbar spine she have scoliosis with right thoracic convexity.  Limited range of motion of the lumbar spine secondary to the pain in the back.  Tentative reflexes are going downward.  Atilio testing are negative    Diagnosis  Problem List Items Addressed This Visit       Lumbar spinal stenosis - Primary    Relevant Medications    acetaminophen-codeine (Tylenol w/ Codeine #3) 300-30 mg tablet    Osteoarthritis of knees, bilateral    Relevant Medications    acetaminophen-codeine (Tylenol w/ Codeine #3) 300-30 mg tablet    Rheumatoid arthritis    Relevant Medications    acetaminophen-codeine (Tylenol w/ Codeine #3) 300-30 mg tablet    Polymyalgia rheumatica (Multi)    Relevant Medications    acetaminophen-codeine (Tylenol w/ Codeine #3) 300-30 mg tablet    Osteoarthritis of knee    Relevant Medications    acetaminophen-codeine (Tylenol w/ Codeine #3) 300-30 mg tablet    Long term current use of opiate analgesic    Relevant Orders    Opiate/Opioid/Benzo Prescription Compliance        Plan  Reviewed the pain generators.  Went over the types of pain with neuropathic and nociceptive and different pathologies and therapeutic modalities. Discussed the mechanism of action of interventions from acupuncture, physical therapy , regular exercises, injections, botox, spinal cord stimulation, and role of surgery     Went over pathology of the intervertebral disc displacement and the anatomical relation to the Nerve roots and relation to the radicular symptoms. Went over treatment modalities with conservative treatment including acupuncture   and epidural steroid injection with fluoroscopy guidance and last resort of surgery    Based on the above findings and the clinical response to the opioids medications and improvement of the activities of daily living, sleep, and work performance. We made this complex decision to continue the opioids therapy  in light of the evidence of the patient's responsibility in using the pain medications as prescribed for the nonmalignant chronic pain condition. We discussed about the use of the pain medications to treat the symptoms of chronic nonmalignant pain and we are not trying the repair the permanent damage in the tissues, rather we are trying to control the symptoms induced by the permanent damage to the tissues inducing the chronic pain condition and resulting disability. I explained the difference and discussed it with the patient and stressed the importance of knowing the difference especially because of the potential side effects and the potential addicting effect and habit forming nature of the dangerous drugs we are using to treat the symptoms of the chronic pain.      We discussed that we are prescribing the medications on good luigi and legitimate medical reason within the scope of professional medical practice.     We reviewed the side effects and precautions of opioids prescriptions as discussed in the opioids treatment agreement.    realizes the interaction between the therapeutic classes including the respiratory depression and potential death     Random drug testing   we will submit         Discussed about NSAIDS and I explained about the opioids sparing effect to allow keeping the opioids dose at minimal effective dose.   I went over the potential side effects of the NSAIDS on the gastrointestinal, renal and cardiovascular systems.      I detailed the side effects from the acetaminophen in the medication and made aware of those. I also explained about the cumulative effects on the organs and mainly the liver.     Given the opioids therapy , we discussed about the risk for accidental over dose on the pain medications, either for patient or other household. I went over the mechanism of action and mode of use of the Naloxone according to the  recommendations. I will provide a prescription for a kit.      Focus of Today's Visit :  At this time continue the pain medication.  Continue with follow-up with rheumatology.  Discussed results of the pain medication are controlling the symptoms and titrating the underlying pathology especially to follow-up with rheumatology  And we will update a urine tract testing.        Follow-up 8 weeks or earlier if needed     The level of clinical decision making in this office visit,  is high, given the high risks of complications with the morbidity and mortality due to the fact that acute and chronic pain may pose a threat to life and bodily function, if under treated, poorly treated, or with failure to maintain adequate treatment and timely medical follow up. Additionally over treatment has its own set of complications including overdosing on the pain medications and also the habit forming potentials with the use of the medications used to treat chronic painful conditions including therapeutic classes classified as dangerous medications. Given the serious and fluctuating nature of pain level and instensity with extensive consideration for whenever pain changes, there is always the risk of prolonged functional impairment requiring close patient monitoring with regular assessments and reassessments and high level medical decision making at every office visit. The amount and complexity of data reviewed is high given the patient clinical presentation, labs,  data, radiology reports, and other tests as discussed during office visits. Pertinent data whether positive or negative were taken in consideration in the process of making this high level medical decision.

## 2025-08-14 ENCOUNTER — APPOINTMENT (OUTPATIENT)
Dept: PAIN MEDICINE | Facility: CLINIC | Age: 80
End: 2025-08-14
Payer: MEDICARE

## 2025-08-18 ENCOUNTER — APPOINTMENT (OUTPATIENT)
Dept: PAIN MEDICINE | Facility: CLINIC | Age: 80
End: 2025-08-18
Payer: MEDICARE

## 2025-08-18 DIAGNOSIS — M48.062 SPINAL STENOSIS OF LUMBAR REGION WITH NEUROGENIC CLAUDICATION: Primary | ICD-10-CM

## 2025-08-18 DIAGNOSIS — M75.42 IMPINGEMENT SYNDROME OF LEFT SHOULDER: ICD-10-CM

## 2025-08-18 DIAGNOSIS — M47.816 SPONDYLOSIS OF LUMBAR SPINE: ICD-10-CM

## 2025-08-18 PROCEDURE — 1160F RVW MEDS BY RX/DR IN RCRD: CPT | Performed by: PHYSICAL MEDICINE & REHABILITATION

## 2025-08-18 PROCEDURE — 1159F MED LIST DOCD IN RCRD: CPT | Performed by: PHYSICAL MEDICINE & REHABILITATION

## 2025-08-18 PROCEDURE — 99214 OFFICE O/P EST MOD 30 MIN: CPT | Performed by: PHYSICAL MEDICINE & REHABILITATION

## 2025-08-18 PROCEDURE — G2211 COMPLEX E/M VISIT ADD ON: HCPCS | Performed by: PHYSICAL MEDICINE & REHABILITATION

## 2025-08-18 RX ORDER — ACETAMINOPHEN AND CODEINE PHOSPHATE 300; 30 MG/1; MG/1
1 TABLET ORAL EVERY 8 HOURS PRN
Qty: 80 TABLET | Refills: 0 | Status: SHIPPED | OUTPATIENT
Start: 2025-09-22 | End: 2025-10-20

## 2025-08-22 LAB
1OH-MIDAZOLAM UR-MCNC: NEGATIVE NG/ML
7AMINOCLONAZEPAM UR-MCNC: NEGATIVE NG/ML
A-OH ALPRAZ UR-MCNC: NEGATIVE NG/ML
A-OH-TRIAZOLAM UR-MCNC: NEGATIVE NG/ML
AMPHETAMINES UR QL: NEGATIVE NG/ML
BARBITURATES UR QL: NEGATIVE NG/ML
BZE UR QL: NEGATIVE NG/ML
CODEINE UR-MCNC: 3090 NG/ML
CREAT UR-MCNC: 55.1 MG/DL
DRUG SCREEN COMMENT UR-IMP: ABNORMAL
EDDP UR-MCNC: NEGATIVE NG/ML
FENTANYL UR-MCNC: ABNORMAL NG/ML
HYDROCODONE UR-MCNC: 62 NG/ML
HYDROMORPHONE UR-MCNC: NEGATIVE NG/ML
LORAZEPAM UR-MCNC: NEGATIVE NG/ML
METHADONE UR-MCNC: NEGATIVE NG/ML
MORPHINE UR-MCNC: 792 NG/ML
NORDIAZEPAM UR-MCNC: NEGATIVE NG/ML
NORFENTANYL UR-MCNC: ABNORMAL NG/ML
NORHYDROCODONE UR CFM-MCNC: 79 NG/ML
NOROXYCODONE UR CFM-MCNC: NEGATIVE NG/ML
NORTRAMADOL UR-MCNC: NEGATIVE NG/ML
OH-ETHYLFLURAZ UR-MCNC: NEGATIVE NG/ML
OXAZEPAM UR-MCNC: NEGATIVE NG/ML
OXIDANTS UR QL: NEGATIVE MCG/ML
OXYCODONE UR CFM-MCNC: NEGATIVE NG/ML
OXYMORPHONE UR CFM-MCNC: NEGATIVE NG/ML
PCP UR QL: NEGATIVE NG/ML
PH UR: 5.6 [PH] (ref 4.5–9)
QUEST 6 ACETYLMORPHINE: ABNORMAL
QUEST NOTES AND COMMENTS: ABNORMAL
QUEST PATIENT HISTORICAL REPORT: ABNORMAL
QUEST ZOLPIDEM: ABNORMAL
TEMAZEPAM UR-MCNC: NEGATIVE NG/ML
THC UR QL: NEGATIVE NG/ML
TRAMADOL UR-MCNC: NEGATIVE NG/ML
ZOLPIDEM PHENYL-4-CARB UR CFM-MCNC: ABNORMAL NG/ML

## 2025-08-25 LAB
1OH-MIDAZOLAM UR-MCNC: NEGATIVE NG/ML
7AMINOCLONAZEPAM UR-MCNC: NEGATIVE NG/ML
A-OH ALPRAZ UR-MCNC: NEGATIVE NG/ML
A-OH-TRIAZOLAM UR-MCNC: NEGATIVE NG/ML
AMPHETAMINES UR QL: NEGATIVE NG/ML
BARBITURATES UR QL: NEGATIVE NG/ML
BZE UR QL: NEGATIVE NG/ML
CODEINE UR-MCNC: 3090 NG/ML
CREAT UR-MCNC: 55.1 MG/DL
DRUG SCREEN COMMENT UR-IMP: ABNORMAL
EDDP UR-MCNC: NEGATIVE NG/ML
FENTANYL UR-MCNC: NEGATIVE NG/ML
HYDROCODONE UR-MCNC: 62 NG/ML
HYDROMORPHONE UR-MCNC: NEGATIVE NG/ML
LORAZEPAM UR-MCNC: NEGATIVE NG/ML
METHADONE UR-MCNC: NEGATIVE NG/ML
MORPHINE UR-MCNC: 792 NG/ML
NORDIAZEPAM UR-MCNC: NEGATIVE NG/ML
NORFENTANYL UR-MCNC: NEGATIVE NG/ML
NORHYDROCODONE UR CFM-MCNC: 79 NG/ML
NOROXYCODONE UR CFM-MCNC: NEGATIVE NG/ML
NORTRAMADOL UR-MCNC: NEGATIVE NG/ML
OH-ETHYLFLURAZ UR-MCNC: NEGATIVE NG/ML
OXAZEPAM UR-MCNC: NEGATIVE NG/ML
OXIDANTS UR QL: NEGATIVE MCG/ML
OXYCODONE UR CFM-MCNC: NEGATIVE NG/ML
OXYMORPHONE UR CFM-MCNC: NEGATIVE NG/ML
PCP UR QL: NEGATIVE NG/ML
PH UR: 5.6 [PH] (ref 4.5–9)
QUEST 6 ACETYLMORPHINE: NEGATIVE NG/ML
QUEST NOTES AND COMMENTS: ABNORMAL
QUEST ZOLPIDEM: NEGATIVE NG/ML
TEMAZEPAM UR-MCNC: NEGATIVE NG/ML
THC UR QL: NEGATIVE NG/ML
TRAMADOL UR-MCNC: NEGATIVE NG/ML
ZOLPIDEM PHENYL-4-CARB UR CFM-MCNC: NEGATIVE NG/ML

## 2025-10-02 ENCOUNTER — APPOINTMENT (OUTPATIENT)
Dept: PRIMARY CARE | Facility: CLINIC | Age: 80
End: 2025-10-02
Payer: MEDICARE

## 2025-10-16 ENCOUNTER — APPOINTMENT (OUTPATIENT)
Dept: PAIN MEDICINE | Facility: CLINIC | Age: 80
End: 2025-10-16
Payer: MEDICARE